# Patient Record
Sex: MALE | Race: WHITE | NOT HISPANIC OR LATINO | Employment: FULL TIME | URBAN - METROPOLITAN AREA
[De-identification: names, ages, dates, MRNs, and addresses within clinical notes are randomized per-mention and may not be internally consistent; named-entity substitution may affect disease eponyms.]

---

## 2023-02-25 LAB
ALANINE AMINOTRANSFERASE (SGPT) (U/L) IN SER/PLAS: 21 U/L (ref 10–52)
ALBUMIN (G/DL) IN SER/PLAS: 4.3 G/DL (ref 3.4–5)
ALKALINE PHOSPHATASE (U/L) IN SER/PLAS: 39 U/L (ref 33–136)
ANION GAP IN SER/PLAS: 11 MMOL/L (ref 10–20)
ASPARTATE AMINOTRANSFERASE (SGOT) (U/L) IN SER/PLAS: 22 U/L (ref 9–39)
BASOPHILS (10*3/UL) IN BLOOD BY AUTOMATED COUNT: 0.06 X10E9/L (ref 0–0.1)
BASOPHILS/100 LEUKOCYTES IN BLOOD BY AUTOMATED COUNT: 0.8 % (ref 0–2)
BILIRUBIN TOTAL (MG/DL) IN SER/PLAS: 0.6 MG/DL (ref 0–1.2)
C REACTIVE PROTEIN (MG/L) IN SER/PLAS: 0.15 MG/DL
CALCIUM (MG/DL) IN SER/PLAS: 9.5 MG/DL (ref 8.6–10.3)
CARBON DIOXIDE, TOTAL (MMOL/L) IN SER/PLAS: 25 MMOL/L (ref 21–32)
CHLORIDE (MMOL/L) IN SER/PLAS: 105 MMOL/L (ref 98–107)
CHOLESTEROL (MG/DL) IN SER/PLAS: 223 MG/DL (ref 0–199)
CHOLESTEROL IN HDL (MG/DL) IN SER/PLAS: 78.5 MG/DL
CHOLESTEROL/HDL RATIO: 2.8
CREATINE KINASE (U/L) IN SER/PLAS: 55 U/L (ref 0–325)
CREATININE (MG/DL) IN SER/PLAS: 0.85 MG/DL (ref 0.5–1.3)
EOSINOPHILS (10*3/UL) IN BLOOD BY AUTOMATED COUNT: 0.46 X10E9/L (ref 0–0.7)
EOSINOPHILS/100 LEUKOCYTES IN BLOOD BY AUTOMATED COUNT: 6.5 % (ref 0–6)
ERYTHROCYTE DISTRIBUTION WIDTH (RATIO) BY AUTOMATED COUNT: 11.8 % (ref 11.5–14.5)
ERYTHROCYTE MEAN CORPUSCULAR HEMOGLOBIN CONCENTRATION (G/DL) BY AUTOMATED: 34.3 G/DL (ref 32–36)
ERYTHROCYTE MEAN CORPUSCULAR VOLUME (FL) BY AUTOMATED COUNT: 91 FL (ref 80–100)
ERYTHROCYTES (10*6/UL) IN BLOOD BY AUTOMATED COUNT: 5.17 X10E12/L (ref 4.5–5.9)
GFR MALE: >90 ML/MIN/1.73M2
GLUCOSE (MG/DL) IN SER/PLAS: 91 MG/DL (ref 74–99)
HEMATOCRIT (%) IN BLOOD BY AUTOMATED COUNT: 47.2 % (ref 41–52)
HEMOGLOBIN (G/DL) IN BLOOD: 16.2 G/DL (ref 13.5–17.5)
IMMATURE GRANULOCYTES/100 LEUKOCYTES IN BLOOD BY AUTOMATED COUNT: 0.3 % (ref 0–0.9)
LDL: 129 MG/DL (ref 0–99)
LEUKOCYTES (10*3/UL) IN BLOOD BY AUTOMATED COUNT: 7.1 X10E9/L (ref 4.4–11.3)
LYMPHOCYTES (10*3/UL) IN BLOOD BY AUTOMATED COUNT: 1.19 X10E9/L (ref 1.2–4.8)
LYMPHOCYTES/100 LEUKOCYTES IN BLOOD BY AUTOMATED COUNT: 16.8 % (ref 13–44)
MONOCYTES (10*3/UL) IN BLOOD BY AUTOMATED COUNT: 0.51 X10E9/L (ref 0.1–1)
MONOCYTES/100 LEUKOCYTES IN BLOOD BY AUTOMATED COUNT: 7.2 % (ref 2–10)
NEUTROPHILS (10*3/UL) IN BLOOD BY AUTOMATED COUNT: 4.85 X10E9/L (ref 1.2–7.7)
NEUTROPHILS/100 LEUKOCYTES IN BLOOD BY AUTOMATED COUNT: 68.4 % (ref 40–80)
PLATELETS (10*3/UL) IN BLOOD AUTOMATED COUNT: 296 X10E9/L (ref 150–450)
POTASSIUM (MMOL/L) IN SER/PLAS: 4 MMOL/L (ref 3.5–5.3)
PROTEIN TOTAL: 7.1 G/DL (ref 6.4–8.2)
SEDIMENTATION RATE, ERYTHROCYTE: 21 MM/H (ref 0–20)
SODIUM (MMOL/L) IN SER/PLAS: 137 MMOL/L (ref 136–145)
TRIGLYCERIDE (MG/DL) IN SER/PLAS: 76 MG/DL (ref 0–149)
UREA NITROGEN (MG/DL) IN SER/PLAS: 18 MG/DL (ref 6–23)
VLDL: 15 MG/DL (ref 0–40)

## 2023-02-28 LAB — ALDOLASE SERUM: 3 U/L (ref 1.2–7.6)

## 2023-03-04 LAB
CITRULLINE ANTIBODY, IGG: 3 UNITS (ref 0–19)
Lab: <3 UNITS (ref 0–19)

## 2023-06-06 LAB
CHOLESTEROL (MG/DL) IN SER/PLAS: 177 MG/DL (ref 0–199)
CHOLESTEROL IN HDL (MG/DL) IN SER/PLAS: 76.9 MG/DL
CHOLESTEROL/HDL RATIO: 2.3
LDL: 86 MG/DL (ref 0–99)
TRIGLYCERIDE (MG/DL) IN SER/PLAS: 69 MG/DL (ref 0–149)
VLDL: 14 MG/DL (ref 0–40)

## 2023-10-18 ENCOUNTER — TELEPHONE (OUTPATIENT)
Dept: PRIMARY CARE | Facility: CLINIC | Age: 63
End: 2023-10-18
Payer: COMMERCIAL

## 2023-10-18 PROBLEM — G47.33 OBSTRUCTIVE SLEEP APNEA: Status: ACTIVE | Noted: 2023-10-18

## 2023-10-18 NOTE — TELEPHONE ENCOUNTER
Pt called and said regarding the sleep apnea study, he is getting a dental guard but his insurance [employer] needs the dx codes and cpt codes, pt insurance also called and said the same thing

## 2023-10-26 ENCOUNTER — TELEMEDICINE (OUTPATIENT)
Dept: PRIMARY CARE | Facility: CLINIC | Age: 63
End: 2023-10-26
Payer: COMMERCIAL

## 2023-10-26 VITALS — BODY MASS INDEX: 26.14 KG/M2 | WEIGHT: 193 LBS | HEIGHT: 72 IN

## 2023-10-26 DIAGNOSIS — Z87.09 HISTORY OF INFLUENZA: ICD-10-CM

## 2023-10-26 DIAGNOSIS — J01.00 ACUTE NON-RECURRENT MAXILLARY SINUSITIS: Primary | ICD-10-CM

## 2023-10-26 DIAGNOSIS — R05.1 ACUTE COUGH: ICD-10-CM

## 2023-10-26 PROCEDURE — 99442 PR PHYS/QHP TELEPHONE EVALUATION 11-20 MIN: CPT | Performed by: INTERNAL MEDICINE

## 2023-10-26 RX ORDER — MULTIVITAMIN
1 TABLET ORAL DAILY
COMMUNITY
Start: 2022-03-07

## 2023-10-26 RX ORDER — ATORVASTATIN CALCIUM 40 MG/1
40 TABLET, FILM COATED ORAL DAILY
COMMUNITY
Start: 2022-03-07

## 2023-10-26 RX ORDER — ASPIRIN 81 MG/1
1 TABLET ORAL DAILY
COMMUNITY
Start: 2022-03-07

## 2023-10-26 RX ORDER — QUINIDINE SULFATE 200 MG
1 TABLET ORAL DAILY
COMMUNITY
Start: 2022-03-07

## 2023-10-26 RX ORDER — AZITHROMYCIN 250 MG/1
250 TABLET, FILM COATED ORAL DAILY
Qty: 6 TABLET | Refills: 0 | Status: SHIPPED | OUTPATIENT
Start: 2023-10-26 | End: 2023-10-31

## 2023-10-26 RX ORDER — VITAMIN E 268 MG
1 CAPSULE ORAL DAILY
COMMUNITY
End: 2024-06-06 | Stop reason: WASHOUT

## 2023-10-26 ASSESSMENT — ENCOUNTER SYMPTOMS
CHILLS: 0
WHEEZING: 1
HEARTBURN: 0
LOSS OF SENSATION IN FEET: 0
FEVER: 0
SWEATS: 0
COUGH: 1
SHORTNESS OF BREATH: 0
SORE THROAT: 1
OCCASIONAL FEELINGS OF UNSTEADINESS: 0
HEADACHES: 0
MYALGIAS: 1
DEPRESSION: 0
HEMOPTYSIS: 0
RHINORRHEA: 1
WEIGHT LOSS: 0

## 2023-10-26 ASSESSMENT — PATIENT HEALTH QUESTIONNAIRE - PHQ9
2. FEELING DOWN, DEPRESSED OR HOPELESS: NOT AT ALL
1. LITTLE INTEREST OR PLEASURE IN DOING THINGS: NOT AT ALL
SUM OF ALL RESPONSES TO PHQ9 QUESTIONS 1 & 2: 0

## 2023-10-26 ASSESSMENT — LIFESTYLE VARIABLES
HOW OFTEN DO YOU HAVE A DRINK CONTAINING ALCOHOL: NEVER
HOW OFTEN DO YOU HAVE SIX OR MORE DRINKS ON ONE OCCASION: NEVER
AUDIT-C TOTAL SCORE: 0
SKIP TO QUESTIONS 9-10: 1
HOW MANY STANDARD DRINKS CONTAINING ALCOHOL DO YOU HAVE ON A TYPICAL DAY: PATIENT DOES NOT DRINK

## 2023-10-26 NOTE — PROGRESS NOTES
Patient has telephone appointment For chief complaint of facial pressure, nasal drainage and productive cough for more than a week.  His symptoms suspected 10 days ago.  1 week ago on Thursday he went to the urgent care at [facility] he was tested for COVID-19 infection and influenza.  Results of the COVID-19 test was negative however he probably tested positive for influenza B.  He was offered a specific treatment however he decided to take symptomatic medication because symptoms were not severe.  But for the last 4 to 5 days symptoms get worse he has cough with yellowish sputum nasal drainage and facial pressure it is clinically suspected he might have developed a secondary bacterial sinus infection.  He has no fever or chill.  Denies any shortness of breath or wheezing.  Review of other systems are negative.    On examination: His temperature is 98.3, weight is 193 pounds.  He could not provide any other vital signs  HEENT: He has facial pressure and nasal drainage  Respiratory system has productive cough but has no shortness of breath or wheezing.    No other clinical information is available.    Assessment and plan:  1.  Recent influenza B; patient is outside the window to get any specific treatment for influenza.  2.  Acute sinusitis: Possibly secondary bacterial infection: A course of azithromycin as prescribed  3.  Cough: Patient will take cough medications over-the-counter as needed  Patient is advised to call our office if there is no improvement in symptoms in next few days.

## 2023-11-20 ENCOUNTER — OFFICE VISIT (OUTPATIENT)
Dept: ORTHOPEDIC SURGERY | Facility: CLINIC | Age: 63
End: 2023-11-20
Payer: COMMERCIAL

## 2023-11-20 ENCOUNTER — ANCILLARY PROCEDURE (OUTPATIENT)
Dept: RADIOLOGY | Facility: CLINIC | Age: 63
End: 2023-11-20
Payer: COMMERCIAL

## 2023-11-20 VITALS — HEIGHT: 72 IN | BODY MASS INDEX: 26.01 KG/M2 | WEIGHT: 192 LBS

## 2023-11-20 DIAGNOSIS — M17.11 PRIMARY OSTEOARTHRITIS OF RIGHT KNEE: ICD-10-CM

## 2023-11-20 DIAGNOSIS — M23.41 LOOSE BODY IN KNEE, RIGHT KNEE: ICD-10-CM

## 2023-11-20 DIAGNOSIS — S83.231A COMPLEX TEAR OF MEDIAL MENISCUS OF RIGHT KNEE AS CURRENT INJURY, INITIAL ENCOUNTER: Primary | ICD-10-CM

## 2023-11-20 PROCEDURE — 99213 OFFICE O/P EST LOW 20 MIN: CPT | Performed by: ORTHOPAEDIC SURGERY

## 2023-11-20 PROCEDURE — 2500000005 HC RX 250 GENERAL PHARMACY W/O HCPCS: Performed by: ORTHOPAEDIC SURGERY

## 2023-11-20 PROCEDURE — 2500000004 HC RX 250 GENERAL PHARMACY W/ HCPCS (ALT 636 FOR OP/ED): Performed by: ORTHOPAEDIC SURGERY

## 2023-11-20 PROCEDURE — 73560 X-RAY EXAM OF KNEE 1 OR 2: CPT | Mod: RIGHT SIDE | Performed by: ORTHOPAEDIC SURGERY

## 2023-11-20 PROCEDURE — 20610 DRAIN/INJ JOINT/BURSA W/O US: CPT | Performed by: ORTHOPAEDIC SURGERY

## 2023-11-20 PROCEDURE — 99203 OFFICE O/P NEW LOW 30 MIN: CPT | Performed by: ORTHOPAEDIC SURGERY

## 2023-11-20 PROCEDURE — 73560 X-RAY EXAM OF KNEE 1 OR 2: CPT | Mod: RT,FY

## 2023-11-20 PROCEDURE — 1036F TOBACCO NON-USER: CPT | Performed by: ORTHOPAEDIC SURGERY

## 2023-11-20 RX ORDER — BETAMETHASONE SODIUM PHOSPHATE AND BETAMETHASONE ACETATE 3; 3 MG/ML; MG/ML
12 INJECTION, SUSPENSION INTRA-ARTICULAR; INTRALESIONAL; INTRAMUSCULAR; SOFT TISSUE ONCE
Status: COMPLETED | OUTPATIENT
Start: 2023-11-20 | End: 2023-11-20

## 2023-11-20 RX ORDER — LIDOCAINE HYDROCHLORIDE 10 MG/ML
5 INJECTION INFILTRATION; PERINEURAL ONCE
Status: COMPLETED | OUTPATIENT
Start: 2023-11-20 | End: 2023-11-20

## 2023-11-20 RX ADMIN — BETAMETHASONE SODIUM PHOSPHATE AND BETAMETHASONE ACETATE 12 MG: 3; 3 INJECTION, SUSPENSION INTRA-ARTICULAR; INTRALESIONAL; INTRAMUSCULAR at 16:06

## 2023-11-20 RX ADMIN — LIDOCAINE HYDROCHLORIDE 5 ML: 10 INJECTION, SOLUTION INFILTRATION; PERINEURAL at 16:06

## 2023-11-20 ASSESSMENT — PATIENT HEALTH QUESTIONNAIRE - PHQ9
SUM OF ALL RESPONSES TO PHQ9 QUESTIONS 1 AND 2: 0
1. LITTLE INTEREST OR PLEASURE IN DOING THINGS: NOT AT ALL
2. FEELING DOWN, DEPRESSED OR HOPELESS: NOT AT ALL

## 2023-11-20 ASSESSMENT — PAIN - FUNCTIONAL ASSESSMENT: PAIN_FUNCTIONAL_ASSESSMENT: 0-10

## 2023-11-20 ASSESSMENT — PAIN SCALES - GENERAL: PAINLEVEL_OUTOF10: 7

## 2023-11-20 NOTE — PROGRESS NOTES
History of present: Patient seen years and years ago for calf injury shoulder injury now with mechanical popping catching in his right knee    He has a history when he was in high school having an open MCL shift to his knee he was in a long-leg cast for many months he has a staple in his medial femoral condyle he is got some calcified loose pieces medially now and now he notices the knee giving out locking up and then catching        Past medical history:    The patient's past medical history, family history, social history and review of systems were documented on the patient's medical intake form.  The medical intake form was reviewed and scanned into the electronic medical record for future use.  History is otherwise negative except as stated in the history of present illness.    Physical exam:    General: Alert and oriented to person place and time.  No acute distress and breathing comfortably, pleasant and cooperative with examination.    Head and neck exam: Head is normocephalic atraumatic.    Neck: Supple no visible swelling or deformity.    Cardiovascular: Good perfusion to affected extremities without signs or symptoms of chest pain.    Lungs no audible wheezing or labored breathing on examination.    Abdominal exam: Nondistended nontender    Extremities: The affected right knee was examined and inspected and was tender to touch along the medial and lateral aspect with catching, locking or mechanical symptoms.    The skin was intact without breakdown or open wound.  Old incisions of present were healed.    There was a mild Venkatesh exam seen with some evidence of instability and weakness in the collateral ligaments with varus valgus stressing and laxity in the anterior or posterior planes.    There was a negative Lachman's test pivot shift test and posterior drawer sign with no foot drop, numbness or tingling.    Sensation, reflexes and pulses in the foot and ankle are preserved.  There was an effusion.   Range of motion showed good straight leg raise with flexion to 150 degrees  and extension to 0 degrees degrees.  The patient had the ability to bear weight but with discomfort.  The patient's gait was antalgic secondary to discomfort      Before aspiration injection the benefits of a cortisone injection including infection, local skin irritation, skin atrophy, calcification, continued pain and discomfort, elevated blood sugar, burning, failure to relieve pain, possible late infection were discussed with the patient.    Postprocedure discomfort can be alleviated with additional medications, ice, elevation, rest over the first 24 hours as recommended.    Patient verbalized understanding and wanted to proceed with the planned procedure.    After informed consent was provided and allergies verified, the patient was positioned appropriately on the bed.  The right knee to be aspirated and injected was prepped and draped in a sterile fashion.  The skin was anesthetized with ethyl chloride spray.  A joint aspiration was to be performed an 18-gauge needle was used otherwise a 22-gauge needle was used to inject the appropriate joint.    Joint injection was performed with a mixture of 5 cc 1% lidocaine plain and 2 cc Celestone Soluspan 6 mg per mL.  The needle was removed and the puncture site closed and sealed with a Band-Aid.  The patient tolerated the procedure well.            Diagnostic studies: X-rays show joint space narrowing near bone-on-bone medially stable in the medial femoral condyle with loose bodies particularly in the medial gutter      Impression: Questionable loose body, early arthritis old history of MCL open reconstruction possible degenerative meniscus tear      Plan: Right knee MRI    Injection ice elevate    Low impact activities anti-inflammatories if safe and tolerable    We will see him back after MRI imaging study

## 2023-12-07 ENCOUNTER — ANCILLARY PROCEDURE (OUTPATIENT)
Dept: RADIOLOGY | Facility: CLINIC | Age: 63
End: 2023-12-07
Payer: COMMERCIAL

## 2023-12-07 DIAGNOSIS — M17.11 PRIMARY OSTEOARTHRITIS OF RIGHT KNEE: ICD-10-CM

## 2023-12-07 DIAGNOSIS — S83.231A COMPLEX TEAR OF MEDIAL MENISCUS OF RIGHT KNEE AS CURRENT INJURY, INITIAL ENCOUNTER: ICD-10-CM

## 2023-12-07 DIAGNOSIS — M23.41 LOOSE BODY IN KNEE, RIGHT KNEE: ICD-10-CM

## 2023-12-07 PROCEDURE — 73721 MRI JNT OF LWR EXTRE W/O DYE: CPT | Mod: RIGHT SIDE | Performed by: RADIOLOGY

## 2023-12-07 PROCEDURE — 73721 MRI JNT OF LWR EXTRE W/O DYE: CPT | Mod: RT

## 2023-12-14 ENCOUNTER — APPOINTMENT (OUTPATIENT)
Dept: ORTHOPEDIC SURGERY | Facility: CLINIC | Age: 63
End: 2023-12-14
Payer: COMMERCIAL

## 2023-12-14 ENCOUNTER — OFFICE VISIT (OUTPATIENT)
Dept: ORTHOPEDIC SURGERY | Facility: CLINIC | Age: 63
End: 2023-12-14
Payer: COMMERCIAL

## 2023-12-14 DIAGNOSIS — M17.11 PRIMARY OSTEOARTHRITIS OF RIGHT KNEE: Primary | ICD-10-CM

## 2023-12-14 PROCEDURE — 99213 OFFICE O/P EST LOW 20 MIN: CPT | Performed by: ORTHOPAEDIC SURGERY

## 2023-12-14 PROCEDURE — L1812 KO ELASTIC W/JOINTS PRE OTS: HCPCS | Performed by: ORTHOPAEDIC SURGERY

## 2023-12-14 PROCEDURE — 1036F TOBACCO NON-USER: CPT | Performed by: ORTHOPAEDIC SURGERY

## 2023-12-14 NOTE — PROGRESS NOTES
History of present illness: Patient here follow-up after cortisone injection in the affected right knee MRI showed combination of thickening tricompartmental arthritis cystic change laterally degenerative meniscus tearing thickened MCL stable fixation remained stable    Physical exam:    General: No acute distress or breathing difficulty or discomfort, pleasant and cooperative with the examination.    Extremities: Right knee examination today completed again patient says in his own words he is 80% improved    Straight leg raise is good he can walk he can bend he has more pain when he twists and pivots negative Lachman's collateral.  He is intact but irritable laterally    Mild Venkatesh with deep flexion 0-100 70s range of motion mild effusion injection sites clean and dry good foot and ankle motion good straight leg raise intact extensor mechanism    Diagnostic studies: Reviewed MRI today again with a degenerative meniscal finding degenerative osteoarthritis thickening of the MCL and cystic change laterally    Impression: Gel shot for comfort for arthritic changes recommended and ordered bracing for stability with right knee mild discomfort combination of arthritis and meniscal tearing and thickening of the collateral ligaments all right knee    Plan: Will order the brace    Will order the gels    Ice elevate low impact activities we spent time talking about exercise and range of motion programs low impact activity hopefully we can avoid arthroplasty down the road a scope may be in order hopefully can avoid arthroplasty for years to come

## 2023-12-28 ENCOUNTER — OFFICE VISIT (OUTPATIENT)
Dept: ORTHOPEDIC SURGERY | Facility: CLINIC | Age: 63
End: 2023-12-28
Payer: COMMERCIAL

## 2023-12-28 DIAGNOSIS — M17.11 PRIMARY OSTEOARTHRITIS OF RIGHT KNEE: ICD-10-CM

## 2023-12-28 PROCEDURE — 2500000004 HC RX 250 GENERAL PHARMACY W/ HCPCS (ALT 636 FOR OP/ED): Mod: JZ | Performed by: ORTHOPAEDIC SURGERY

## 2023-12-28 PROCEDURE — 1036F TOBACCO NON-USER: CPT | Performed by: ORTHOPAEDIC SURGERY

## 2023-12-28 PROCEDURE — 20610 DRAIN/INJ JOINT/BURSA W/O US: CPT | Performed by: ORTHOPAEDIC SURGERY

## 2023-12-28 RX ADMIN — Medication 48 MG: at 11:32

## 2023-12-28 NOTE — PROGRESS NOTES
Before the right injection the benefits of a hyaluronic acid  injection including infection, local skin irritation, skin atrophy, calcification, continued pain and discomfort, elevated blood sugar, burning, failure to relieve pain, possible late infection were discussed with the patient.    Postprocedure discomfort can be alleviated with additional medications, ice, elevation, rest over the first 24 hours as recommended.    Patient verbalized understanding and wanted to proceed with the planned procedure.    After informed consent was provided and allergies verified, the patient was positioned appropriately on the bed.  The RIGHT KNEE to be injected was prepped and draped in a sterile fashion.  The skin was anesthetized with ethyl chloride spray.   A  22-gauge needle was used to inject the appropriate joint.    Joint injection was performed with Synvisc 1 injection contains 48 mg of hyaluronic acid per 6 mL was performed.  The needle was removed and the puncture site closed and sealed with a Band-Aid.  The patient tolerated the procedure well.

## 2024-05-31 ENCOUNTER — LAB (OUTPATIENT)
Dept: LAB | Facility: LAB | Age: 64
End: 2024-05-31
Payer: COMMERCIAL

## 2024-05-31 DIAGNOSIS — E78.2 MIXED HYPERLIPIDEMIA: Primary | ICD-10-CM

## 2024-05-31 LAB
CHOLEST SERPL-MCNC: 163 MG/DL (ref 0–199)
CHOLESTEROL/HDL RATIO: 2
HDLC SERPL-MCNC: 80.2 MG/DL
LDLC SERPL CALC-MCNC: 70 MG/DL
NON HDL CHOLESTEROL: 83 MG/DL (ref 0–149)
TRIGL SERPL-MCNC: 62 MG/DL (ref 0–149)
VLDL: 12 MG/DL (ref 0–40)

## 2024-05-31 PROCEDURE — 80061 LIPID PANEL: CPT

## 2024-05-31 PROCEDURE — 36415 COLL VENOUS BLD VENIPUNCTURE: CPT

## 2024-06-06 ENCOUNTER — OFFICE VISIT (OUTPATIENT)
Dept: CARDIOLOGY | Facility: CLINIC | Age: 64
End: 2024-06-06
Payer: COMMERCIAL

## 2024-06-06 VITALS
DIASTOLIC BLOOD PRESSURE: 78 MMHG | HEART RATE: 66 BPM | WEIGHT: 198.4 LBS | SYSTOLIC BLOOD PRESSURE: 142 MMHG | BODY MASS INDEX: 26.91 KG/M2

## 2024-06-06 DIAGNOSIS — E78.2 MIXED HYPERLIPIDEMIA: Primary | ICD-10-CM

## 2024-06-06 DIAGNOSIS — I25.10 CORONARY ARTERY DISEASE INVOLVING NATIVE HEART, UNSPECIFIED VESSEL OR LESION TYPE, UNSPECIFIED WHETHER ANGINA PRESENT: ICD-10-CM

## 2024-06-06 DIAGNOSIS — Z78.9 NEVER SMOKED CIGARETTES: ICD-10-CM

## 2024-06-06 DIAGNOSIS — G47.33 OBSTRUCTIVE SLEEP APNEA: ICD-10-CM

## 2024-06-06 PROCEDURE — 3008F BODY MASS INDEX DOCD: CPT | Performed by: INTERNAL MEDICINE

## 2024-06-06 PROCEDURE — 1036F TOBACCO NON-USER: CPT | Performed by: INTERNAL MEDICINE

## 2024-06-06 PROCEDURE — 99214 OFFICE O/P EST MOD 30 MIN: CPT | Performed by: INTERNAL MEDICINE

## 2024-06-06 RX ORDER — DICLOFENAC SODIUM 10 MG/G
4 GEL TOPICAL 4 TIMES DAILY
COMMUNITY

## 2024-06-06 RX ORDER — GLUCOSAM/CHONDRO/HERB 149/HYAL 750-100 MG
TABLET ORAL
COMMUNITY

## 2024-06-06 NOTE — PROGRESS NOTES
CARDIOLOGY OFFICE VISIT      CHIEF COMPLAINT      HISTORY OF PRESENT ILLNESS  The patient states that he has been feeling well.  He denies chest discomfort or symptoms of myocardial ischemia.  He denies dyspnea exertion.  He denies palpitations and syncope.  He states he is having more problems with his right knee and has not been exercising like he normally does.  He states he was told by orthopedics that probably within 5 years he will need to have knee surgery.  He is not having any problem with his medication.  His blood pressure is borderline elevated.  I told him he needs to start checking it regularly and making a chart and call me with results in about a month.  I did go over his recent lipid profile with him.  This is much improved.  Cholesterol 163, HDL 80, LDL 70, triglycerides 62.      IMPRESSION:   1. Mixed hyperlipidemia.  2. 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  3. Strong family history of coronary artery disease, father  at age 52 years from a myocardial infarction.  4. Overweight.  5. Coronary artery disease, no angina, CT coronary artery calcium score 57 May 2019  6. Obstructive sleep apnea  7. History of transient global amnesia     Please excuse any errors in grammar or translation related to this dictation. Voice recognition software was utilized to prepare this document.     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     Allergies:  Allergies   Allergen Reactions    Pollen Extracts Wheezing and Runny nose        Outpatient Medications:  Current Outpatient Medications   Medication Instructions    alpha tocopherol (Vitamin E) 268 mg (400 unit) capsule 1 capsule, oral, Daily    aspirin 81 mg EC tablet 1 tablet, oral, Daily    atorvastatin (LIPITOR) 40 mg, oral, Daily    coenzyme Q-10 300 mg capsule capsule 1 capsule, oral, Daily    multivitamin tablet 1 tablet, oral, Daily    mv-mn-iron-FA-herbal cmplx#190 (Vitamin D3 Complete) 18 mg iron-800 mcg-150 mg tablet 1 tablet, oral, Daily    vit B complx/folic acid/lysine (B COMPLEX VITAMINS PO) oral          REVIEW OF SYSTEMS  Review of Systems   All other systems reviewed and are negative.        VITALS  There were no vitals filed for this visit.    PHYSICAL EXAM  Constitutional:       Appearance: Healthy appearance. Not in distress.   Neck:      Vascular: No JVR. JVD normal.   Pulmonary:      Effort: Pulmonary effort is normal.      Breath sounds: Normal breath sounds. No wheezing. No rhonchi. No rales.   Chest:      Chest wall: Not tender to palpatation.   Cardiovascular:      PMI at left midclavicular line. Normal rate. Regular rhythm. Normal S1. Normal S2.       Murmurs: There is no murmur.      No gallop.  No click. No rub.   Pulses:     Intact distal pulses.   Edema:     Peripheral edema absent.   Abdominal:      General: Bowel sounds are normal.      Palpations: Abdomen is soft.      Tenderness: There is no abdominal tenderness.   Musculoskeletal: Normal range of motion.         General: No tenderness. Skin:     General: Skin is warm and dry.   Neurological:      General: No focal deficit present.      Mental Status: Alert and oriented to person, place and time.           ASSESSMENT AND PLAN  Diagnoses and all orders for this visit:  Coronary artery disease involving native heart, unspecified vessel or lesion type, unspecified whether angina present  Mixed  hyperlipidemia  Obstructive sleep apnea  BMI 26.0-26.9,adult  Never smoked cigarettes      [unfilled]

## 2024-06-06 NOTE — PATIENT INSTRUCTIONS
Continue same medications and treatments.   Patient educated on proper medication use.   Patient educated on risk factor modification.   Please bring any lab results from other providers / physicians to your next appointment.     Please bring all medicines, vitamins, and herbal supplements with you when you come to the office.     Prescriptions will not be filled unless you are compliant with your follow up appointments or have a follow up appointment scheduled as per instruction of your physician. Refills should be requested at the time of your visit.    FOLLOW UP IN 1 YEAR  OBTAIN LAB WORK PRIOR TO THIS VISIT, THIS WILL BE FASTING    I, Ileana Salmeron LPN, am scribing for and in the presence of Dr. Jeovany Doty MD, FACC

## 2024-07-04 DIAGNOSIS — E78.2 MIXED HYPERLIPIDEMIA: ICD-10-CM

## 2024-07-05 RX ORDER — ATORVASTATIN CALCIUM 40 MG/1
40 TABLET, FILM COATED ORAL DAILY
Qty: 90 TABLET | Refills: 3 | Status: SHIPPED | OUTPATIENT
Start: 2024-07-05

## 2024-07-05 NOTE — TELEPHONE ENCOUNTER
Received request for prescription refills for patient.   Patient follows with Dr. Doty    Request is for Lipitor  Is patient currently on medication yes    Last OV 6/6/2024  Next OV 6/4/2025    Pended for signing and sent to provider

## 2024-10-02 ENCOUNTER — OFFICE VISIT (OUTPATIENT)
Dept: ORTHOPEDIC SURGERY | Facility: CLINIC | Age: 64
End: 2024-10-02
Payer: COMMERCIAL

## 2024-10-02 DIAGNOSIS — M17.11 PRIMARY OSTEOARTHRITIS OF RIGHT KNEE: Primary | ICD-10-CM

## 2024-10-02 PROCEDURE — 2500000004 HC RX 250 GENERAL PHARMACY W/ HCPCS (ALT 636 FOR OP/ED): Performed by: ORTHOPAEDIC SURGERY

## 2024-10-02 PROCEDURE — 20610 DRAIN/INJ JOINT/BURSA W/O US: CPT | Mod: RT | Performed by: ORTHOPAEDIC SURGERY

## 2024-10-02 PROCEDURE — 99213 OFFICE O/P EST LOW 20 MIN: CPT | Performed by: ORTHOPAEDIC SURGERY

## 2024-10-02 RX ORDER — BETAMETHASONE SODIUM PHOSPHATE AND BETAMETHASONE ACETATE 3; 3 MG/ML; MG/ML
2 INJECTION, SUSPENSION INTRA-ARTICULAR; INTRALESIONAL; INTRAMUSCULAR; SOFT TISSUE
Status: COMPLETED | OUTPATIENT
Start: 2024-10-02 | End: 2024-10-02

## 2024-10-02 RX ORDER — LIDOCAINE HYDROCHLORIDE 10 MG/ML
5 INJECTION, SOLUTION INFILTRATION; PERINEURAL
Status: COMPLETED | OUTPATIENT
Start: 2024-10-02 | End: 2024-10-02

## 2024-10-02 NOTE — PROGRESS NOTES
History of present illness: History knee arthritis previous open MCL reconstruction    In the past he had a mixed result with cortisone and gel shots    He is trying to get through to the spring 2025 before arthroplasty    Physical exam:    General: No acute distress or breathing difficulty or discomfort, pleasant and cooperative with the examination.    Extremities: The affected right knee was examined and inspected and was tender to touch along the medial and lateral aspect with catching, locking or mechanical symptoms.    The skin was intact without breakdown or open wound.  Old incisions of present were healed.    There was a mild Venkatesh exam seen with some evidence of instability and weakness in the collateral ligaments with varus valgus stressing and laxity in the anterior or posterior planes.    There was a negative Lachman's test pivot shift test and posterior drawer sign with no foot drop, numbness or tingling.    Sensation, reflexes and pulses in the foot and ankle are preserved.  There was an effusion.  Range of motion showed good straight leg raise with flexion to 150 degrees  and extension to 0 degrees degrees.  The patient had the ability to bear weight but with discomfort.  The patient's gait was antalgic secondary to discomfort      Before aspiration injection the benefits of a cortisone injection including infection, local skin irritation, skin atrophy, calcification, continued pain and discomfort, elevated blood sugar, burning, failure to relieve pain, possible late infection were discussed with the patient.    Postprocedure discomfort can be alleviated with additional medications, ice, elevation, rest over the first 24 hours as recommended.    Patient verbalized understanding and wanted to proceed with the planned procedure.    After informed consent was provided and allergies verified, the patient was positioned appropriately on the bed.  The right knee to be aspirated and injected was prepped  and draped in a sterile fashion.  The skin was anesthetized with ethyl chloride spray.  A joint aspiration was to be performed an 18-gauge needle was used otherwise a 22-gauge needle was used to inject the appropriate joint.    Joint injection was performed with a mixture of 5 cc 1% lidocaine plain and 2 cc Celestone Soluspan 6 mg per mL.  The needle was removed and the puncture site closed and sealed with a Band-Aid.  The patient tolerated the procedure well.        Diagnostic studies: No new x-ray    We did review the x-rays from last November and the MRI from December 2023 showing a combination of moderate to severe arthrosis with underlying meniscus tear    Impression: Right knee advancing arthritis    Plan: Injection cortisone ice elevate he already has a brace low impact range of motion program he is going to use Voltaren gels certain anti-inflammatories if okay from his medical doctors also lidocaine patches may be beneficial he is already tried bracing    I will see him back in 3 months with AP lateral standing x-ray right knee    If his cortisone shot gives him brief relief he will call [facility] to order gel shots, we also talked about the remote chance of a knee arthroscopy giving him some temporary relief as well due to the known meniscal tearing    Again if his cortisone shot fails to give him substantial relief his options at this point would be to consider a knee arthroscopy and/or reapply for gel injections    L Inj/Asp: R knee on 10/2/2024 9:03 AM  Indications: pain and diagnostic evaluation  Details: 22 G needle, medial approach  Medications: 5 mL lidocaine 10 mg/mL (1 %); 2 mL betamethasone acet,sod phos 6 mg/mL  Outcome: tolerated well, no immediate complications  Procedure, treatment alternatives, risks and benefits explained, specific risks discussed. Consent was given by the patient. Immediately prior to procedure a time out was called to verify the correct patient, procedure, equipment, support  staff and site/side marked as required. Patient was prepped and draped in the usual sterile fashion.

## 2025-01-09 ENCOUNTER — OFFICE VISIT (OUTPATIENT)
Dept: ORTHOPEDIC SURGERY | Facility: CLINIC | Age: 65
End: 2025-01-09
Payer: COMMERCIAL

## 2025-01-09 ENCOUNTER — HOSPITAL ENCOUNTER (OUTPATIENT)
Dept: RADIOLOGY | Facility: CLINIC | Age: 65
Discharge: HOME | End: 2025-01-09
Payer: COMMERCIAL

## 2025-01-09 DIAGNOSIS — M23.41 LOOSE BODY IN KNEE, RIGHT KNEE: ICD-10-CM

## 2025-01-09 DIAGNOSIS — M17.11 PRIMARY OSTEOARTHRITIS OF RIGHT KNEE: Primary | ICD-10-CM

## 2025-01-09 PROCEDURE — 99213 OFFICE O/P EST LOW 20 MIN: CPT | Performed by: ORTHOPAEDIC SURGERY

## 2025-01-09 PROCEDURE — 73560 X-RAY EXAM OF KNEE 1 OR 2: CPT | Mod: RIGHT SIDE | Performed by: ORTHOPAEDIC SURGERY

## 2025-01-09 PROCEDURE — 73560 X-RAY EXAM OF KNEE 1 OR 2: CPT | Mod: RT

## 2025-01-09 NOTE — PROGRESS NOTES
History of present illness: History of advanced right knee arthritis    He had an old MCL reconstruction with stable his knee is very stable but painful despite rest activity modification cortisone shot gel injections therapy water program elliptical both steroid and gel shots he is obviously having pain with ADLs would like to proceed with knee    Physical exam:    General: No acute distress or breathing difficulty or discomfort, pleasant and cooperative with the examination.    Extremities: The affected right knee was examined and inspected and was tender to touch along the medial and lateral aspect with catching, locking or mechanical symptoms.    The skin was intact without breakdown or open wound.  Old incisions of present were healed.    There was a mild Venkatesh exam seen with some evidence of instability and weakness in the collateral ligaments with varus valgus stressing and laxity in the anterior or posterior planes.    There was a negative Lachman's test pivot shift test and posterior drawer sign with no foot drop, numbness or tingling.    Sensation, reflexes and pulses in the foot and ankle are preserved.  There was an effusion.  Range of motion showed good straight leg raise with flexion to 150 degrees and extension to 0 degrees.  The patient had the ability to bear weight but with discomfort.  The patient's gait was antalgic secondary to discomfort    Diagnostic studies: X-rays show advancing now osteoarthritic change near bone-on-bone with severe patellofemoral arthrosis old staple and a loose ossicle medial    Impression: Right knee osteoarthritis advancing in nature with severe arthrosis severe patellofemoral arthritis   stable old MCL repair with staple and a loose ossicle in the MCL right knee    Plan: Right knee replacement staple removal    Risk and benefits of surgery discussed extensively with the patient.    Surgical risk included but were not limited to infection, wear, loosening, need for  further surgery blood clot, failure to heal, failure of the surgery, stiffness, loss of limb life, extremity function change in length change, and associated risks of  any surgery.    Patient anticipates going home the next morning using a large doing her stay suites at the  ambulatory surgery center and then going direct outpatient and rehab program at [facility]    Staple movable total knee replacement and will look for to see him in the operative schedule

## 2025-03-10 ENCOUNTER — OFFICE VISIT (OUTPATIENT)
Dept: PRIMARY CARE | Facility: CLINIC | Age: 65
End: 2025-03-10
Payer: COMMERCIAL

## 2025-03-10 VITALS
HEIGHT: 72 IN | HEART RATE: 67 BPM | BODY MASS INDEX: 27.63 KG/M2 | SYSTOLIC BLOOD PRESSURE: 157 MMHG | OXYGEN SATURATION: 93 % | WEIGHT: 204 LBS | DIASTOLIC BLOOD PRESSURE: 84 MMHG

## 2025-03-10 DIAGNOSIS — M19.90 ARTHRITIS: Primary | ICD-10-CM

## 2025-03-10 PROCEDURE — 99213 OFFICE O/P EST LOW 20 MIN: CPT | Performed by: FAMILY MEDICINE

## 2025-03-10 PROCEDURE — 1036F TOBACCO NON-USER: CPT | Performed by: FAMILY MEDICINE

## 2025-03-10 PROCEDURE — 3008F BODY MASS INDEX DOCD: CPT | Performed by: FAMILY MEDICINE

## 2025-03-10 RX ORDER — MELOXICAM 15 MG/1
15 TABLET ORAL DAILY
Qty: 90 TABLET | Refills: 0 | Status: SHIPPED | OUTPATIENT
Start: 2025-03-10 | End: 2025-06-08

## 2025-03-10 ASSESSMENT — ENCOUNTER SYMPTOMS
CHILLS: 0
HEADACHES: 0
LOSS OF SENSATION IN FEET: 0
DIZZINESS: 0
FATIGUE: 0
OCCASIONAL FEELINGS OF UNSTEADINESS: 0
CHEST TIGHTNESS: 0
SHORTNESS OF BREATH: 0
DEPRESSION: 0

## 2025-03-10 ASSESSMENT — PATIENT HEALTH QUESTIONNAIRE - PHQ9
SUM OF ALL RESPONSES TO PHQ9 QUESTIONS 1 & 2: 0
2. FEELING DOWN, DEPRESSED OR HOPELESS: NOT AT ALL
1. LITTLE INTEREST OR PLEASURE IN DOING THINGS: NOT AT ALL

## 2025-03-10 NOTE — PROGRESS NOTES
Subjective   Patient ID: Mark Mendez is a 64 y.o. male who presents for Arthritis (Arthritis pain all over, x 1 month ).    Arthritis   - reports for the last month he has been having significantly worse arthritis pain   - has been having issues when he sits still for long periods of time will have persistent pain afterwards   - pain is worse when he is more active   - has pain all over his body, both in the muscles and the joints   - pain in his shoulders, hands, thighs, calves   - does stay active and works out with an elliptical which does help his pain   - has plans for a knee replacement in 1 month   - denies any warmth, redness, swelling, fevers or chills   - father did have rheumatoid arthritis          Review of Systems   Constitutional:  Negative for chills and fatigue.   Respiratory:  Negative for chest tightness and shortness of breath.    Neurological:  Negative for dizziness and headaches.       Objective   /84 (BP Location: Right arm)   Pulse 67   Ht 1.829 m (6')   Wt 92.5 kg (204 lb)   SpO2 93%   BMI 27.67 kg/m²     Physical Exam  Constitutional:       Appearance: Normal appearance.   Cardiovascular:      Rate and Rhythm: Normal rate and regular rhythm.   Pulmonary:      Effort: Pulmonary effort is normal.      Breath sounds: Normal breath sounds.   Neurological:      Mental Status: He is alert.   Psychiatric:         Mood and Affect: Mood normal.         Behavior: Behavior normal.         Assessment/Plan   Problem List Items Addressed This Visit    None  Visit Diagnoses         Codes    Arthritis    -  Primary M19.90    stable   - NSAID PRN   - has knee replacement scheduled in 2 weeks   - recommend PT after testing     Relevant Medications    meloxicam (Mobic) 15 mg tablet

## 2025-03-12 ENCOUNTER — LAB (OUTPATIENT)
Dept: LAB | Facility: HOSPITAL | Age: 65
End: 2025-03-12
Payer: COMMERCIAL

## 2025-03-12 ENCOUNTER — APPOINTMENT (OUTPATIENT)
Dept: CARDIOLOGY | Facility: CLINIC | Age: 65
End: 2025-03-12
Payer: COMMERCIAL

## 2025-03-12 VITALS
DIASTOLIC BLOOD PRESSURE: 78 MMHG | WEIGHT: 200.5 LBS | HEART RATE: 70 BPM | BODY MASS INDEX: 27.16 KG/M2 | SYSTOLIC BLOOD PRESSURE: 140 MMHG | HEIGHT: 72 IN

## 2025-03-12 DIAGNOSIS — I25.10 CORONARY ARTERY DISEASE INVOLVING NATIVE HEART, UNSPECIFIED VESSEL OR LESION TYPE, UNSPECIFIED WHETHER ANGINA PRESENT: ICD-10-CM

## 2025-03-12 DIAGNOSIS — Z01.818 PRE-OP TESTING: ICD-10-CM

## 2025-03-12 DIAGNOSIS — E78.2 MIXED HYPERLIPIDEMIA: ICD-10-CM

## 2025-03-12 DIAGNOSIS — Z82.49 FAMILY HISTORY OF CORONARY ARTERY DISEASE: ICD-10-CM

## 2025-03-12 DIAGNOSIS — Z78.9 NEVER SMOKED CIGARETTES: ICD-10-CM

## 2025-03-12 DIAGNOSIS — G47.33 OBSTRUCTIVE SLEEP APNEA: ICD-10-CM

## 2025-03-12 DIAGNOSIS — Z01.818 PREOPERATIVE CLEARANCE: ICD-10-CM

## 2025-03-12 LAB
ALBUMIN SERPL BCP-MCNC: 4.4 G/DL (ref 3.4–5)
ALP SERPL-CCNC: 59 U/L (ref 33–136)
ALT SERPL W P-5'-P-CCNC: 24 U/L (ref 10–52)
ANION GAP SERPL CALC-SCNC: 10 MMOL/L (ref 10–20)
AST SERPL W P-5'-P-CCNC: 23 U/L (ref 9–39)
BASOPHILS # BLD AUTO: 0.05 X10*3/UL (ref 0–0.1)
BASOPHILS NFR BLD AUTO: 0.6 %
BILIRUB SERPL-MCNC: 0.7 MG/DL (ref 0–1.2)
BUN SERPL-MCNC: 21 MG/DL (ref 6–23)
CALCIUM SERPL-MCNC: 9.8 MG/DL (ref 8.6–10.3)
CHLORIDE SERPL-SCNC: 103 MMOL/L (ref 98–107)
CO2 SERPL-SCNC: 30 MMOL/L (ref 21–32)
CREAT SERPL-MCNC: 0.9 MG/DL (ref 0.5–1.3)
EGFRCR SERPLBLD CKD-EPI 2021: >90 ML/MIN/1.73M*2
EOSINOPHIL # BLD AUTO: 0.2 X10*3/UL (ref 0–0.7)
EOSINOPHIL NFR BLD AUTO: 2.5 %
ERYTHROCYTE [DISTWIDTH] IN BLOOD BY AUTOMATED COUNT: 11.7 % (ref 11.5–14.5)
EST. AVERAGE GLUCOSE BLD GHB EST-MCNC: 105 MG/DL
GLUCOSE SERPL-MCNC: 108 MG/DL (ref 74–99)
HBA1C MFR BLD: 5.3 %
HCT VFR BLD AUTO: 48.3 % (ref 41–52)
HGB BLD-MCNC: 16.8 G/DL (ref 13.5–17.5)
IMM GRANULOCYTES # BLD AUTO: 0.02 X10*3/UL (ref 0–0.7)
IMM GRANULOCYTES NFR BLD AUTO: 0.2 % (ref 0–0.9)
INR PPP: 1 (ref 0.9–1.1)
LYMPHOCYTES # BLD AUTO: 1.13 X10*3/UL (ref 1.2–4.8)
LYMPHOCYTES NFR BLD AUTO: 13.9 %
MCH RBC QN AUTO: 31.9 PG (ref 26–34)
MCHC RBC AUTO-ENTMCNC: 34.8 G/DL (ref 32–36)
MCV RBC AUTO: 92 FL (ref 80–100)
MONOCYTES # BLD AUTO: 0.58 X10*3/UL (ref 0.1–1)
MONOCYTES NFR BLD AUTO: 7.1 %
NEUTROPHILS # BLD AUTO: 6.14 X10*3/UL (ref 1.2–7.7)
NEUTROPHILS NFR BLD AUTO: 75.7 %
NRBC BLD-RTO: 0 /100 WBCS (ref 0–0)
PLATELET # BLD AUTO: 320 X10*3/UL (ref 150–450)
POTASSIUM SERPL-SCNC: 4.9 MMOL/L (ref 3.5–5.3)
PROT SERPL-MCNC: 6.8 G/DL (ref 6.4–8.2)
PROTHROMBIN TIME: 11 SECONDS (ref 9.8–12.4)
RBC # BLD AUTO: 5.26 X10*6/UL (ref 4.5–5.9)
SODIUM SERPL-SCNC: 138 MMOL/L (ref 136–145)
WBC # BLD AUTO: 8.1 X10*3/UL (ref 4.4–11.3)

## 2025-03-12 PROCEDURE — 3008F BODY MASS INDEX DOCD: CPT | Performed by: INTERNAL MEDICINE

## 2025-03-12 PROCEDURE — 93000 ELECTROCARDIOGRAM COMPLETE: CPT | Performed by: INTERNAL MEDICINE

## 2025-03-12 PROCEDURE — 99214 OFFICE O/P EST MOD 30 MIN: CPT | Performed by: INTERNAL MEDICINE

## 2025-03-12 PROCEDURE — 85025 COMPLETE CBC W/AUTO DIFF WBC: CPT

## 2025-03-12 PROCEDURE — 1036F TOBACCO NON-USER: CPT | Performed by: INTERNAL MEDICINE

## 2025-03-12 PROCEDURE — 85610 PROTHROMBIN TIME: CPT

## 2025-03-12 PROCEDURE — 83036 HEMOGLOBIN GLYCOSYLATED A1C: CPT

## 2025-03-12 PROCEDURE — 80053 COMPREHEN METABOLIC PANEL: CPT

## 2025-03-12 RX ORDER — DIPHENHYDRAMINE HCL 25 MG
25 TABLET ORAL DAILY
COMMUNITY

## 2025-03-12 NOTE — PROGRESS NOTES
CARDIOLOGY OFFICE VISIT      CHIEF COMPLAINT  Chief Complaint   Patient presents with    Pre-op Clearance     For R knee surgery with Dr MIGUELINA Waters on 2025        HISTORY OF PRESENT ILLNESS  The patient states that he has been doing well except for problems with his joints.  He is going to have right knee replacement surgery in the near future by Dr. Waters.  He denies chest discomfort or symptoms of myocardial ischemia.  He denies problems with dyspnea exertion.  He denies palpitations and syncope.  He denies any problems medication.    EKG: Normal sinus rhythm, within normal limits, results discussed with patient      IMPRESSION:   1. Mixed hyperlipidemia.  2. 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  3. Strong family history of coronary artery disease, father  at age 52 years from a myocardial infarction.  4. Overweight.  5. Coronary artery disease, no angina, CT coronary artery calcium score 57 May 2019  6. Obstructive sleep apnea  7. History of transient global amnesia     The patient's cardiac status is stable at this time.  He is a satisfactory risk for his upcoming knee surgery assuming routine preop lab work is good.      Please excuse any errors in grammar or translation related to this dictation. Voice recognition software was utilized to prepare this document.         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     Allergies:  Allergies   Allergen Reactions    Pollen Extracts Wheezing and Runny nose        Outpatient Medications:  Current Outpatient Medications   Medication Instructions    aspirin 81 mg EC tablet 1 tablet, Daily    atorvastatin (LIPITOR) 40 mg, oral, Daily    coenzyme Q-10 300 mg capsule capsule 1 capsule, Daily    diphenhydrAMINE (ALLERGY) 25 mg, Daily    HERBAL DRUGS ORAL 500 mg, Daily    meloxicam (MOBIC) 15 mg, oral, Daily    multivitamin tablet 1 tablet, Daily    mv-mn-iron-FA-herbal cmplx#190 (Vitamin D3 Complete) 18 mg iron-800 mcg-150 mg tablet 1 tablet, Daily    omega 3-dha-epa-fish oil (Fish OiL) 1,000 mg (120 mg-180 mg) capsule Take by mouth.    vit B complx/folic acid/lysine (B COMPLEX VITAMINS PO) Take by mouth.          REVIEW OF SYSTEMS  Review of Systems   All other systems reviewed and are negative.        VITALS  Vitals:    03/12/25 0809   BP: 140/78   Pulse: 70       PHYSICAL EXAM  Constitutional:       Appearance: Healthy appearance. Not in distress.   Eyes:      Conjunctiva/sclera: Conjunctivae normal.      Pupils: Pupils are equal, round, and reactive to light.   Neck:      Vascular: No JVR. JVD normal.   Pulmonary:      Effort: Pulmonary effort is normal.      Breath sounds: Normal breath sounds. No wheezing. No rhonchi. No rales.   Chest:      Chest wall: Not tender to palpatation.   Cardiovascular:      PMI at left midclavicular line. Normal rate. Regular rhythm. Normal S1. Normal S2.       Murmurs: There is no murmur.      No gallop.  No click. No rub.   Pulses:     Intact distal pulses.   Edema:     Peripheral edema absent.   Abdominal:      Tenderness: There is no abdominal tenderness.   Musculoskeletal: Normal range of motion.         General: No tenderness.      Cervical back: Normal range of motion. Skin:     General: Skin is warm and dry.   Neurological:      General: No focal deficit  present.      Mental Status: Alert and oriented to person, place and time.           ASSESSMENT AND PLAN  Diagnoses and all orders for this visit:  Preoperative clearance  Mixed hyperlipidemia  Family history of coronary artery disease  Coronary artery disease involving native heart, unspecified vessel or lesion type, unspecified whether angina present  Obstructive sleep apnea  BMI 27.0-27.9,adult  Never smoked cigarettes      [unfilled]      I,Indu Monroy LPN am scribing for, and in the presence of Dr. Jeovany Doty.    I, Dr. Jeovany Doty, personally performed the services described in the documentation as scribed by Indu Monroy LPN   in my presence, and confirm it is both accurate and complete.      Dr. Jeovany Noland MD  Thank you for allowing me to participate in the care of this patient. Please do not hesitate to contact me with any further questions or concerns.

## 2025-03-12 NOTE — PATIENT INSTRUCTIONS
Patient is an acceptable cardiac risk for upcoming hip surgery.  Please hold your ASA, Meloxicam and fish oil for 7 days prior to surgery and resume following.  After recovery from surgery, please monitor and record your B/P for a month and let the office know if your systolic number is consistently above 140.  Follow up office visit in 1 year.  Continue same medications/treatment.  Patient educated on proper medication use.  Patient educated on risk factor modification.  Please bring any lab results from other providers / physicians to your next appointment.    Please bring all medicines, vitamins and herbal supplements with you when you come to the office.    Prescriptions will not be filled unless you are compliant with your follow up appointments or have a follow up  appointment scheduled as per instruction of your physician.  Refills should be requested at the time of  Your visit.

## 2025-03-27 ENCOUNTER — APPOINTMENT (OUTPATIENT)
Dept: PRIMARY CARE | Facility: CLINIC | Age: 65
End: 2025-03-27
Payer: COMMERCIAL

## 2025-03-27 VITALS
SYSTOLIC BLOOD PRESSURE: 130 MMHG | TEMPERATURE: 96.8 F | WEIGHT: 201 LBS | HEART RATE: 81 BPM | HEIGHT: 72 IN | DIASTOLIC BLOOD PRESSURE: 62 MMHG | BODY MASS INDEX: 27.22 KG/M2

## 2025-03-27 DIAGNOSIS — I25.10 CORONARY ARTERY DISEASE INVOLVING NATIVE HEART, UNSPECIFIED VESSEL OR LESION TYPE, UNSPECIFIED WHETHER ANGINA PRESENT: ICD-10-CM

## 2025-03-27 DIAGNOSIS — M17.11 PRIMARY OSTEOARTHRITIS OF RIGHT KNEE: Primary | ICD-10-CM

## 2025-03-27 DIAGNOSIS — G47.33 OBSTRUCTIVE SLEEP APNEA: ICD-10-CM

## 2025-03-27 DIAGNOSIS — L60.0 INGROWN TOENAIL: ICD-10-CM

## 2025-03-27 PROCEDURE — 3008F BODY MASS INDEX DOCD: CPT | Performed by: FAMILY MEDICINE

## 2025-03-27 PROCEDURE — 1036F TOBACCO NON-USER: CPT | Performed by: FAMILY MEDICINE

## 2025-03-27 PROCEDURE — 99215 OFFICE O/P EST HI 40 MIN: CPT | Performed by: FAMILY MEDICINE

## 2025-03-27 RX ORDER — CELECOXIB 200 MG/1
200 CAPSULE ORAL 2 TIMES DAILY
Qty: 60 CAPSULE | Refills: 0 | Status: SHIPPED | OUTPATIENT
Start: 2025-03-27 | End: 2025-04-26

## 2025-03-27 ASSESSMENT — PATIENT HEALTH QUESTIONNAIRE - PHQ9
2. FEELING DOWN, DEPRESSED OR HOPELESS: NEARLY EVERY DAY
5. POOR APPETITE OR OVEREATING: NOT AT ALL
10. IF YOU CHECKED OFF ANY PROBLEMS, HOW DIFFICULT HAVE THESE PROBLEMS MADE IT FOR YOU TO DO YOUR WORK, TAKE CARE OF THINGS AT HOME, OR GET ALONG WITH OTHER PEOPLE: SOMEWHAT DIFFICULT
3. TROUBLE FALLING OR STAYING ASLEEP OR SLEEPING TOO MUCH: NEARLY EVERY DAY
9. THOUGHTS THAT YOU WOULD BE BETTER OFF DEAD, OR OF HURTING YOURSELF: NOT AT ALL
1. LITTLE INTEREST OR PLEASURE IN DOING THINGS: NEARLY EVERY DAY
SUM OF ALL RESPONSES TO PHQ9 QUESTIONS 1 AND 2: 6
7. TROUBLE CONCENTRATING ON THINGS, SUCH AS READING THE NEWSPAPER OR WATCHING TELEVISION: NEARLY EVERY DAY
SUM OF ALL RESPONSES TO PHQ QUESTIONS 1-9: 18
6. FEELING BAD ABOUT YOURSELF - OR THAT YOU ARE A FAILURE OR HAVE LET YOURSELF OR YOUR FAMILY DOWN: NOT AT ALL
4. FEELING TIRED OR HAVING LITTLE ENERGY: NEARLY EVERY DAY
8. MOVING OR SPEAKING SO SLOWLY THAT OTHER PEOPLE COULD HAVE NOTICED. OR THE OPPOSITE, BEING SO FIGETY OR RESTLESS THAT YOU HAVE BEEN MOVING AROUND A LOT MORE THAN USUAL: NEARLY EVERY DAY

## 2025-03-27 NOTE — PROGRESS NOTES
Pre admit   R Knee replacement, 4/8, [facility], Dr Kurtz     Already had EKG done

## 2025-03-27 NOTE — PROGRESS NOTES
Patient is here for preadmission clearance.  He already has clearance from cardiology.  He is already had multiple surgeries in the right knee and now is having replacement.  Is also having worsening overall generalized arthritis.  Number that his dad was so bad passed away in his 50s.  Multiple other family members with very bad arthritis.  He had seen rheumatology on 2 years ago and did not do well on the meloxicam but does do well on twice a day 440 mg Aleve.  They had thought that possibly he did have PMR.  He is interested in revisiting after his surgery.  He also has issues with his left great toe and wanted that looked at as well.  It is painful and he is still toed be boots and some other shoes.  He did vomit after anesthesia once but otherwise has not had any problems.  No false teeth bridges or partials.  No glasses or contacts    REVIEW OF SYSTEMS: 12 systems negative except for those mentioned in the HPI. Reviewed home risks with patient. Patient feels safe at home.    PHYSICAL EXAMINATION:   Constitutional: The patient is alert and oriented x3, in no acute  distress.  Eyes: Extraocular movements are intact. Pupils are equal and reactive to light  ENT: Bilateral TMs within normal limits. Nasal turbinates are within normal limits. Throat within normal limits.  Mallampati score of 1  Neck: Supple without lymphadenopathy or JVD.  Heart: Regular rate and rhythm without murmur, click, gallop, or rub.  Lungs: Clear to auscultation bilaterally. No rales, rhonchi, or  wheezing.  Abdomen: Soft, nontender, nondistended. Normoactive bowel sounds.   No palpable masses. Normal percussion  Musculoskeletal: 5/5 motor, upper and lower extremities.  Neurologic: Cranial nerves II through XII fully intact. +2/4 DTRs  in ankle and knee.  Psychiatric: Good judgment and insight. Normal affect and mood. No cognitive defects noted.  Lymphatic: Negative as noted above.  Skin: Thickening with decreased nail of the left great toe  on the medial aspect no acute inflammation    Assessment:  Per EMR    Plan:  Patient is cleared with low risk for surgery.  Discussed switching over to Celebrex with some of the neurosurgeons are fine with having all the way up to the day of surgery.  He can talk with his orthopedist to see if they would allow that versus just simply Tylenol.  After the knee surgery would definitely wish to refollow-up with rheumatology and he does seem to see a great improvement weeks not wintertime and cold.  If everything resolves he may not wish to do and is going to retire in a year and a half.  EKG cardiology note was reviewed this will be forwarded to the surgeon will follow-up after surgery and also can consider removing the ingrown toenail  Discussed with the patient and reviewed annual wellness questionnaire form as well as cognitive deficits. Also discussed with the patient immunizations and risks for colonoscopy and other screening procedures    This dictation was created using Dragon dictation and may contain errors

## 2025-04-03 ENCOUNTER — OFFICE VISIT (OUTPATIENT)
Dept: ORTHOPEDIC SURGERY | Facility: CLINIC | Age: 65
End: 2025-04-03
Payer: COMMERCIAL

## 2025-04-03 DIAGNOSIS — M17.11 PRIMARY OSTEOARTHRITIS OF RIGHT KNEE: ICD-10-CM

## 2025-04-03 DIAGNOSIS — Z96.651 STATUS POST TOTAL RIGHT KNEE REPLACEMENT: Primary | ICD-10-CM

## 2025-04-03 PROCEDURE — 99211 OFF/OP EST MAY X REQ PHY/QHP: CPT | Performed by: PHYSICIAN ASSISTANT

## 2025-04-03 PROCEDURE — L1830 KO IMMOB CANVAS LONG PRE OTS: HCPCS | Performed by: PHYSICIAN ASSISTANT

## 2025-04-03 RX ORDER — CHLORHEXIDINE GLUCONATE ORAL RINSE 1.2 MG/ML
SOLUTION DENTAL
Qty: 30 ML | Refills: 0 | Status: SHIPPED | OUTPATIENT
Start: 2025-04-03

## 2025-04-03 RX ORDER — ONDANSETRON 4 MG/1
4 TABLET, FILM COATED ORAL EVERY 8 HOURS PRN
Qty: 20 TABLET | Refills: 0 | Status: SHIPPED | OUTPATIENT
Start: 2025-04-03 | End: 2025-04-10

## 2025-04-03 RX ORDER — ACETAMINOPHEN 325 MG/1
650 TABLET ORAL EVERY 6 HOURS PRN
Qty: 42 TABLET | Refills: 0 | Status: SHIPPED | OUTPATIENT
Start: 2025-04-03

## 2025-04-03 RX ORDER — CYCLOBENZAPRINE HCL 10 MG
10 TABLET ORAL 3 TIMES DAILY PRN
Qty: 21 TABLET | Refills: 0 | Status: SHIPPED | OUTPATIENT
Start: 2025-04-03 | End: 2025-04-10

## 2025-04-03 RX ORDER — OXYCODONE HYDROCHLORIDE 5 MG/1
5 TABLET ORAL EVERY 6 HOURS PRN
Qty: 28 TABLET | Refills: 0 | Status: SHIPPED | OUTPATIENT
Start: 2025-04-03 | End: 2025-04-10

## 2025-04-03 RX ORDER — CHLORHEXIDINE GLUCONATE 40 MG/ML
SOLUTION TOPICAL
Qty: 473 ML | Refills: 0 | Status: SHIPPED | OUTPATIENT
Start: 2025-04-03

## 2025-04-03 RX ORDER — ASPIRIN 81 MG/1
81 TABLET ORAL 2 TIMES DAILY
Qty: 60 TABLET | Refills: 0 | Status: SHIPPED | OUTPATIENT
Start: 2025-04-03 | End: 2025-05-03

## 2025-04-03 RX ORDER — DOCUSATE SODIUM 100 MG/1
100 CAPSULE, LIQUID FILLED ORAL 2 TIMES DAILY PRN
Qty: 60 CAPSULE | Refills: 0 | Status: SHIPPED | OUTPATIENT
Start: 2025-04-03

## 2025-04-03 NOTE — PROGRESS NOTES
History and Physical      CHIEF COMPLAINT: Right knee pain    HISTORY OF PRESENT ILLNESS:      The patient is a64 y.o. male with significant past medical history of right knee pain Long history of osteoarthritis and remote history of ACL reconstruction.  He has not exhausted conservative measures after risk benefits alternatives were discussed patient likes to proceed with right total knee replacement.      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     History of transposition of great arteries    Personal history of other diseases of the nervous system and sense organs     History of sleep apnea    Sleep apnea         Past Surgical History:    Past Surgical History:   Procedure Laterality Date    OTHER SURGICAL HISTORY  03/29/2022    Complete colonoscopy    OTHER SURGICAL HISTORY  03/29/2022    Knee surgery    OTHER SURGICAL HISTORY  03/29/2022    Shoulder surgery       Medications Prior to Admission:    Current Outpatient Medications on File Prior to Visit   Medication Sig Dispense Refill    aspirin 81 mg EC tablet Take 1 tablet (81 mg) by mouth once daily.      atorvastatin (Lipitor) 40 mg tablet TAKE ONE TABLET BY MOUTH EVERY DAY 90 tablet 3    celecoxib (CeleBREX) 200 mg capsule Take 1 capsule (200 mg) by mouth 2 times a day. 60 capsule 0    coenzyme Q-10 300 mg capsule capsule Take 1 capsule (300 mg) by mouth once daily.      diphenhydrAMINE (Allergy) 25 mg tablet Take 1 tablet (25 mg) by mouth early in the morning..      HERBAL DRUGS ORAL Take 500 mg by mouth once daily. TUMERIC      meloxicam (Mobic) 15 mg tablet Take 1 tablet (15 mg) by mouth once daily. 90 tablet 0    multivitamin tablet Take 1 tablet by mouth once daily.      mv-mn-iron-FA-herbal cmplx#190 (Vitamin D3 Complete) 18 mg iron-800 mcg-150 mg tablet Take 1 tablet by mouth once daily.      omega 3-dha-epa-fish oil (Fish OiL) 1,000  mg (120 mg-180 mg) capsule Take by mouth.      vit B complx/folic acid/lysine (B COMPLEX VITAMINS PO) Take by mouth.       No current facility-administered medications on file prior to visit.        Allergies:  Pollen extracts    Social History:   Social History     Socioeconomic History    Marital status:      Spouse name: Not on file    Number of children: Not on file    Years of education: Not on file    Highest education level: Not on file   Occupational History    Not on file   Tobacco Use    Smoking status: Never     Passive exposure: Never    Smokeless tobacco: Never   Vaping Use    Vaping status: Never Used   Substance and Sexual Activity    Alcohol use: Not Currently     Alcohol/week: 8.0 standard drinks of alcohol     Types: 8 Cans of beer per week    Drug use: Not Currently    Sexual activity: Yes     Partners: Female     Birth control/protection: Male Sterilization   Other Topics Concern    Not on file   Social History Narrative    Not on file     Social Drivers of Health     Financial Resource Strain: Not on file   Food Insecurity: Not on file   Transportation Needs: Not on file   Physical Activity: Not on file   Stress: Not on file   Social Connections: Not on file   Intimate Partner Violence: Not on file   Housing Stability: Not on file       Family History:   Family History   Problem Relation Name Age of Onset    Heart attack Father Mark         Review of systems: Noncontributory for orthopedics    Vitals:  There were no vitals taken for this visit.    Physical examination:  Head normocephalic atraumatic  Heart regular rate and rhythm  Lungs clear  Abdominal exam nontender nondistended  Extremity: Right knee well-healed incision from previous surgery mild positive effusion slight valgus deformity current range of motion is 0 to 115 degrees    Impression : Right knee degenerative joint disease with hardware from ACL reconstruction      Plan:  Scheduled for right total knee replacement removal  of ACL hardware    Risk and benefits of surgery discussed extensively with the patient.    Surgical risk included but were not limited to infection, wear, loosening, need for further surgery blood clot, failure to heal, failure of the surgery, stiffness, loss of limb life, extremity function change in length change, and associated risks of surgery during the coronavirus epidemic.    Risk with any surgery including arthroplasty and replacements include but are not limited to:       Wear, loosening, infection, blood clot, DVT, loss of limb, life, delayed recovery, limb length change, instability, dislocation, discomfort with new implant and failure of the procedure.

## 2025-04-08 ENCOUNTER — OUTSIDE PROCEDURE (OUTPATIENT)
Dept: ORTHOPEDIC SURGERY | Facility: CLINIC | Age: 65
End: 2025-04-08
Payer: COMMERCIAL

## 2025-04-08 PROCEDURE — 27447 TOTAL KNEE ARTHROPLASTY: CPT | Performed by: PHYSICIAN ASSISTANT

## 2025-04-08 PROCEDURE — 27447 TOTAL KNEE ARTHROPLASTY: CPT | Performed by: ORTHOPAEDIC SURGERY

## 2025-04-08 NOTE — PROGRESS NOTES
Preop diagnosis: Right knee osteoarthritis and painful hardware    Postop diagnosis: Right knee osteoarthritis and painful hardware    Procedure:  Right total knee replacement 72441    Right knee removal of deep hardware or implant 90477    Surgeon: Rajendra Vazquez  Assistant:  Rajendra Alvarez physician assistant (PAC) was required and present throughout the entire case.  Given the nature of the disease process and the procedure, a skilled surgical first assistant was necessary during the entire case.  The assistant was necessary to hold retractors and manipulate extremity during the procedure.  A certified scrub tech was also present at the back table managing instruments with supplies for the surgical case    Anesthesia: Spinal with regional      Blood loss: Minimal    Fluids: Less than 2 L       Indications: Right knee severe pain advancing deformity painful hardware failing conservative treatment now for total knee replacement and hardware removal      Summation of event:    Operative Report:       Procedure right total knee replacement    Components size:    Femoral size cemented Maddie persona 8     Tibial size cemented Maddie persona G    Patella size cemented 35 mm    Courtney size 16 mm PS    Alignment varus    Summation of surgical procedure    The patient was taken to the operating room and surgical timeout performed.  The patient received preoperative antibiotics.  After satisfactory anesthetic the appropriate knee was prepped and draped in the usual sterile fashion.  A medial prepatellar incision was made and dissection carried sharply through the skin and underlying fat to the fascial tissue.  A medial capsular incision was made and the joint entered.  There was a modest amount of synovial fluid.  Synovium was hypertrophic.    A drill hole was made in the center of the femur and the intramedullary guide inserted.  The cutting jig was inserted with 3° of external rotation and  the distal femoral cut was made.  The jig the rods were removed and the appropriate size template inserted.  The anterior and posterior condyles were cut with an oscillating saw, and the chamfering cuts were made.  Final preparation of the femur was completed and this allowed for translating the tibia anteriorly.    At this time we could see part of the staple on the medial femoral condyle it was protruding near the chamfer cuts.  Using a osteotome and rongeur we easily removed the staple without any injury to the MCL or medial femoral condyle.    A drill hole was made in the proximal tibia just in line with the old ACL footprint.  An intramedullary guide was used in the tibia and a cut was made 2 mm below the worn tibial surface after guidepins were placed and the tibial cutting guide was fitted on the anterior tibia with approximately 3° of slope posteriorly.    After the tibia was cut appropriate sizing of the flexion-extension gaps was measured Intra-Op, adjustments were made as needed to correct the gaps so that they were symmetric in both flexion and extension.  We then trialed the appropriate size tibia on the proximal tibia and finish tibial preparation first with a punch after insertion of the central reamer.    Trial femur and tibial components were inserted and we selected and trialed the appropriate poly iner.  This was used to control stability which was assessed in both flexion extension and the varus valgus plane.    With the knee in full extension the patella preparation was completed we reamed the patella with the appropriate size reamer to leave a residual 14-15 mm of natural patella.  Final patella preparation included 3 drill holes and patellar tracking was assessed.    Trials were inserted, appropriate sizes were determined and ligament balance was performed.  A batch of cement was mixed after lavage and careful bone drying, procedures were inserted and held in full extension until the cement  had hardened with a patella clamp in place.  The ligaments were checked and stable.  The wound was then closed in layers using #1 Vicryl to close the arthrotomy and a figure-of-eight manner, inverted interrupted 2-0 Vicryl the subcutaneous tissues, a running 3-0 Monocryl in the subcutaneous and skin layers compressive dressing was then applied and a bulky sterile fashion.  The patient tolerated the procedure well and returned to the post anesthesia recovery room in satisfactory condition.  Postop  operative x-ray was reviewed and the findings

## 2025-04-10 ENCOUNTER — EVALUATION (OUTPATIENT)
Dept: PHYSICAL THERAPY | Facility: CLINIC | Age: 65
End: 2025-04-10
Payer: COMMERCIAL

## 2025-04-10 DIAGNOSIS — M17.11 PRIMARY OSTEOARTHRITIS OF RIGHT KNEE: ICD-10-CM

## 2025-04-10 DIAGNOSIS — Z96.651 STATUS POST TOTAL RIGHT KNEE REPLACEMENT: ICD-10-CM

## 2025-04-10 PROCEDURE — 97162 PT EVAL MOD COMPLEX 30 MIN: CPT | Mod: GP | Performed by: GENERAL ACUTE CARE HOSPITAL

## 2025-04-10 PROCEDURE — 97110 THERAPEUTIC EXERCISES: CPT | Mod: GP | Performed by: GENERAL ACUTE CARE HOSPITAL

## 2025-04-10 NOTE — LETTER
April 10, 2025    Abelino Britton, TREMAINE  [address] [facility]   [facility], [address] [address]    Patient: Reza Mendez   YOB: 1960   Date of Visit: 4/10/2025       Dear Rajendra Alvarez PA-C  [address] [facility], [address] [place] [address],  [place] [address]    The attached plan of care is being sent to you because your patient’s medical reimbursement requires that you certify the plan of care. Your signature is required to allow uninterrupted insurance coverage.      You may indicate your approval by signing below and faxing this form back to us at Dept Fax: [phone].    Please call Dept: [phone] with any questions or concerns.    Thank you for this referral,        Abelino Britton, TREMAINE  [address] [address]    Payer: Payor: [employer] [place] / Plan: [employer] / Product Type: *No Product type* /                                                                         Date:     Dear Abelino Britton, PT,     Re: Mr. Mark Mendez, MRN:[id number]    I certify that I have reviewed the attached plan of care and it is medically necessary for Mr. Mark Mendez (1960) who is under my care.          ______________________________________                    _________________  Provider name and credentials                                           Date and time                                                                                           Plan of Care 4/10/25   Effective from: 4/10/2025  Effective to: 7/9/2025    Plan ID: [id number]            Participants as of Finalize on 4/10/2025    Name Type Comments Contact Info    Rajendra Alvarez PA-C Referring Provider  [phone]    Ableino Britton PT Physical Therapist  [phone]       Last Plan Note     Author: Abelino Britton PT Status: Incomplete Last edited: 4/10/2025  9:00 AM       Patient Name: Mark  "Mendez \"Reza\"   MRN: [id number]  Today's Date: 4/10/2025       Current Problem:  1. Primary osteoarthritis of right knee  Referral to Physical Therapy      2. 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, hiking and boating   Prior Treatment and results of Prior treatment: PT injections and cartilage supplement   Constitutional Symptoms: Patient Denies   Fever Chills Nausea Vomiting Loss of appetite Abdominal pain Change in bowel function Weight loss or gain Headache Unexplained fatigue Malaise Lethargy Weakness Arthralgia  Myalgia  Difficulty in sleeping Breathing trouble  Barriers to treatment/ learning: none     Gait: decreased Wbing on right, step to with walker  Observation: edema right leg, covered with antibiotic bandage, no visual sign of infection  Deep Tendon Reflexes 2+ bilateral and symmetrical        Patella       Achilles    AROM (degrees)  Knee AROM R/L: 66-22-0/  134-2    Strength:  Knee Extension R/L:  NT#/  5/5  Knee Flexion R/L:  NT#/  5/5       Functional Tests (R/L):     SLS   NT     Squat  unable     Stairs not doing     LEFS: 13    Evaluation, Tests and measures, Education including HEP instruction and feedback. Patient appears to be compliant and motivated to perform HEP as instructed and participate in active physical therapy as indicated. The patient was educated on: the importance of positioning, proper posture, and body mechanics, " joint mechanics and pathology, general tissue healing time, the appropriate use of heat and cold to control pain and inflammation, the importance of general therapeutic exercise, especially to stay within pain-free ROM, specific anatomy, function, & regional interdependence of involved areas, & likely cause of impairments & POC.  Patient's questions were answered to their satisfaction, & patient verbalized understanding & agreement with POC.  The patient presents to Physical Therapy with signs and symptoms consistent with the medical diagnosis of knee OA sp TKA 4/8/25. Key impairments include:   pain decreased strength ROM and difficulty with functional activities such as walking stairs activity tolerance. Skilled PT is required to address these key impairments and to provide and progress with an appropriate home exercise program. This evaluation is of  mod complexity due to the nature of the patient's presentation as well as the comorbidities and medical factors included in this evaluation.    Access Code: [email or web address]  URL: [email or web address]  Date: 04/10/2025  Prepared by: Abelino Britton    Exercises  - Supine Knee Extension Strengthening  - 3 x daily - 7 x weekly - 10 reps  - Supine Ankle Pumps  - 3 x daily - 7 x weekly - 10 reps  - Supine Quad Set  - 3 x daily - 7 x weekly - 10 reps  - Supine Gluteal Sets  - 3 x daily - 7 x weekly - 10 reps  - Seated Long Arc Quad  - 3 x daily - 7 x weekly - 10 reps  - Seated Heel Slide  - 3 x daily - 7 x weekly - 10 reps  - Seated Hamstring Stretch  - 3 x daily - 7 x weekly - 1 reps - 30 hold  - Seated Heel Raise  - 3 x daily - 7 x weekly - 20 reps  - Seated Toe Raise  - 3 x daily - 7 x weekly - 20 reps  - Seated Calf Stretch with Strap  - 3 x daily - 7 x weekly - 1 reps - 30 hold    Treatment may include: Therapeutic Exercise (PROM, AA/AROM, Flexibility, Strengthening, Stabilization, HEP instruction). Therapeutic Activities (Transfers, Body Mechanics,  Work Related Activities, Closed Chain, Agility and Power).   Manual Techniques (Soft tissue Mobilization, Joint mobilization/Distraction, Muscle Energy Techniques, Lymphatic Drainage, Dry Needling).  Neuromuscular Reeducation (Postural Training, Balance/Proprioception, Relaxation Techniques). Biofeedback. Aquatic Exercise. Modalities: Ultrasound, Moist Heat, Cryotherapy, Vasopneumatic with or without Cryotherapy. Electrical Stimulation (TENS/IFC/ Pre modulated for pain relief, NMES for Muscle reeducation). Gait Training. Orthotic Fit and Training. Strapping, Kinesio taping.     Patient will report resting pain on Visual Analog Scale < or =0  .   Pain at worst will be < or = 0 .   Pain with (patients primary complaint) will be < or =  0.    Patient will have improved knee strength >/=  5/5, knee flexion >/=  5/5 to improve joint mechanics with functional tasks.    Knee Range of motion will improve knee Flexion>/=  130 , Extension >/= 0   to improve joint mechanics with functional activities.    Lower Extremity Functional Scale (LEFS) score will improve >/= 9 points to demonstrate overall improved functional abilities.    Patient will demonstrate minimal or no gait deviation with ambulating >/=  30 minutes, all surfaces, up/down stairs reciprocally.    Patient will correctly perform home exercise program Independently, demonstrated through patient teach back upon discharge.         Current Participants as of 4/10/2025    Name Type Comments Contact Info    Rajendra Alvarez PA-C Referring Provider  [phone]    Signature pending    Abelino Britton PT Physical Therapist  [phone]    Signature pending

## 2025-04-10 NOTE — PROGRESS NOTES
"Patient Name: Mark Mendez \"Reza\" \"Reza\"   MRN: [id number]  Today's Date: 4/10/2025  Time Calculation  Start Time: 0900  Stop Time: 0950  Time Calculation (min): 50 min    Current Problem:  1. Primary osteoarthritis of right knee  Referral to Physical Therapy    Follow Up In Physical Therapy      2. 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, can make calls until cleared to drive, hiking and boating   Prior Treatment and results of Prior treatment: PT injections and cartilage supplement   Constitutional Symptoms: Patient Denies   Fever Chills Nausea Vomiting Loss of appetite Abdominal pain Change in bowel function Weight loss or gain Headache Unexplained fatigue Malaise Lethargy Weakness Arthralgia  Myalgia  Difficulty in sleeping Breathing trouble  Barriers to treatment/ learning: none     Gait: decreased Wbing on right, step to with walker  Observation: edema right leg, covered with antibiotic bandage, no visual sign of infection  Deep Tendon Reflexes 2+ bilateral and symmetrical        Patella       Achilles    AROM (degrees)  Knee AROM R/L: 66-22-0/  134-2    Strength:  Knee Extension R/L:  NT#/  5/5  Knee Flexion R/L:  NT#/  5/5       Functional Tests (R/L):     SLS   NT     Squat  unable     Stairs not doing     LEFS: 13    Evaluation, Tests and measures, Education including HEP instruction and feedback. Patient appears to be compliant and motivated to " perform HEP as instructed and participate in active physical therapy as indicated. The patient was educated on: the importance of positioning, proper posture, and body mechanics, joint mechanics and pathology, general tissue healing time, the appropriate use of heat and cold to control pain and inflammation, the importance of general therapeutic exercise, especially to stay within pain-free ROM, specific anatomy, function, & regional interdependence of involved areas, & likely cause of impairments & POC.  Patient's questions were answered to their satisfaction, & patient verbalized understanding & agreement with POC.  The patient presents to Physical Therapy with signs and symptoms consistent with the medical diagnosis of knee OA sp TKA 4/8/25. Key impairments include:   pain decreased strength ROM and difficulty with functional activities such as walking stairs activity tolerance. Skilled PT is required to address these key impairments and to provide and progress with an appropriate home exercise program. This evaluation is of  mod complexity due to the nature of the patient's presentation as well as the comorbidities and medical factors included in this evaluation.    Access Code: [email or web address]  URL: [email or web address]  Date: 04/10/2025  Prepared by: Abelino Britton    Exercises  - Supine Knee Extension Strengthening  - 3 x daily - 7 x weekly - 10 reps  - Supine Ankle Pumps  - 3 x daily - 7 x weekly - 10 reps  - Supine Quad Set  - 3 x daily - 7 x weekly - 10 reps  - Supine Gluteal Sets  - 3 x daily - 7 x weekly - 10 reps  - Seated Long Arc Quad  - 3 x daily - 7 x weekly - 10 reps  - Seated Heel Slide  - 3 x daily - 7 x weekly - 10 reps  - Seated Hamstring Stretch  - 3 x daily - 7 x weekly - 1 reps - 30 hold  - Seated Heel Raise  - 3 x daily - 7 x weekly - 20 reps  - Seated Toe Raise  - 3 x daily - 7 x weekly - 20 reps  - Seated Calf Stretch with Strap  - 3 x daily - 7 x weekly - 1 reps - 30  hold    Treatment may include: Therapeutic Exercise (PROM, AA/AROM, Flexibility, Strengthening, Stabilization, HEP instruction). Therapeutic Activities (Transfers, Body Mechanics, Work Related Activities, Closed Chain, Agility and Power).   Manual Techniques (Soft tissue Mobilization, Joint mobilization/Distraction, Muscle Energy Techniques, Lymphatic Drainage, Dry Needling).  Neuromuscular Reeducation (Postural Training, Balance/Proprioception, Relaxation Techniques). Biofeedback. Aquatic Exercise. Modalities: Ultrasound, Moist Heat, Cryotherapy, Vasopneumatic with or without Cryotherapy. Electrical Stimulation (TENS/IFC/ Pre modulated for pain relief, NMES for Muscle reeducation). Gait Training. Orthotic Fit and Training. Strapping, Kinesio taping.     Patient will report resting pain on Visual Analog Scale < or =0  .   Pain at worst will be < or = 0 .   Pain with (patients primary complaint) will be < or =  0.    Patient will have improved knee strength >/=  5/5, knee flexion >/=  5/5 to improve joint mechanics with functional tasks.    Knee Range of motion will improve knee Flexion>/=  130 , Extension >/= 0   to improve joint mechanics with functional activities.    Lower Extremity Functional Scale (LEFS) score will improve >/= 9 points to demonstrate overall improved functional abilities.    Patient will demonstrate minimal or no gait deviation with ambulating >/=  30 minutes, all surfaces, up/down stairs reciprocally.    Patient will correctly perform home exercise program Independently, demonstrated through patient teach back upon discharge.

## 2025-04-14 ENCOUNTER — TREATMENT (OUTPATIENT)
Dept: PHYSICAL THERAPY | Facility: CLINIC | Age: 65
End: 2025-04-14
Payer: COMMERCIAL

## 2025-04-14 DIAGNOSIS — Z96.651 STATUS POST TOTAL RIGHT KNEE REPLACEMENT: ICD-10-CM

## 2025-04-14 DIAGNOSIS — M17.11 PRIMARY OSTEOARTHRITIS OF RIGHT KNEE: ICD-10-CM

## 2025-04-14 PROCEDURE — 97110 THERAPEUTIC EXERCISES: CPT | Mod: GP | Performed by: GENERAL ACUTE CARE HOSPITAL

## 2025-04-14 PROCEDURE — 97140 MANUAL THERAPY 1/> REGIONS: CPT | Mod: GP | Performed by: GENERAL ACUTE CARE HOSPITAL

## 2025-04-14 NOTE — PROGRESS NOTES
"Patient Name: aMrk Mendez \"Reza\"  MRN: [id number]  Today's Date: 4/14/2025  Time Calculation  Start Time: 1133  Stop Time: 1230  Time Calculation (min): 57 min  Current Problem:  1. Primary osteoarthritis of right knee  Follow Up In Physical Therapy      2. Status post total right knee replacement  Follow Up In Physical Therapy          Visit 2/20    Subjective:  Change in pain/ pain level: 6/10  Patient comments: doing the exercises as instructed   Notes increased swelling in the calf.       Objective: knee flex 75 deg   Homans-  calf cool with no palpable cording  Treatment:    Therapeutic exercise (63337):    Nustep 5 min  HS/GS/knee flex  Tiltboard  Minisquat  HS seated  LAQ  SAQ  Manual Therapy (88440):  Patella mobs proximal-distal, distal-proximal, lateral diagonals into stiffness  Tibial Femoral A-P flexion and A-P extension multiple knee flexion angles  Stretching into flexion and extension  DTM quadriceps, ITBand/ Hamstring     Assessment:  Patient notes improved pain levels resultant from activities in therapy session. Improved tissue quality noted as well as body mechanics demonstrated after cueing and corrections. Patient continues to require active therapy to progress functional capabilities with decreasing pain levels and improving mechanics with functional activities including progression of Home exercise program. Tolerance to today's treatment was good.   Patient appears motivated and compliant this date, demonstrating a working understanding of principals instructed and home program.    Plan:  Progress with functional strength as tolerated with respect to tissue healing. Manual therapy PRN to improve/maintain ROM, prevent adhesion, reduce pain.  "

## 2025-04-16 ENCOUNTER — TREATMENT (OUTPATIENT)
Dept: PHYSICAL THERAPY | Facility: CLINIC | Age: 65
End: 2025-04-16
Payer: COMMERCIAL

## 2025-04-16 DIAGNOSIS — Z96.651 STATUS POST TOTAL RIGHT KNEE REPLACEMENT: ICD-10-CM

## 2025-04-16 DIAGNOSIS — M17.11 PRIMARY OSTEOARTHRITIS OF RIGHT KNEE: ICD-10-CM

## 2025-04-16 PROCEDURE — 97140 MANUAL THERAPY 1/> REGIONS: CPT | Mod: GP | Performed by: GENERAL ACUTE CARE HOSPITAL

## 2025-04-16 PROCEDURE — 97110 THERAPEUTIC EXERCISES: CPT | Mod: GP | Performed by: GENERAL ACUTE CARE HOSPITAL

## 2025-04-16 RX ORDER — OXYCODONE HYDROCHLORIDE 5 MG/1
5 TABLET ORAL EVERY 6 HOURS PRN
Qty: 28 TABLET | Refills: 0 | Status: SHIPPED | OUTPATIENT
Start: 2025-04-16 | End: 2025-04-23

## 2025-04-16 NOTE — PROGRESS NOTES
"Patient Name: Mark Mendez \"Reza\"  MRN: [id number]  Today's Date: 4/16/2025  Time Calculation  Start Time: 0858  Stop Time: 0900  Time Calculation (min): 2 min  Current Problem:  1. Primary osteoarthritis of right knee  Follow Up In Physical Therapy      2. Status post total right knee replacement  Follow Up In Physical Therapy          Visit 3/20    Subjective:  Change in pain/ pain level: 5/10  Patient comments: stiff and sore  today. Doing a lot more, showered went up/down stairs, doing HEP, and took at least 30 laps around the house     Objective: knee flexion 67 deg AROM    Treatment:    Therapeutic exercise (24273):    Nustep 5 min  HS/GS/knee flex 60 sec  Tiltboard L2 x20 F/L   Minisquat x15  Heel raise x20  Step up 4 in x10  HS seated  LAQ  SAQ  PROM- on shoulder   Manual Therapy (59884):    Edema massage, on shoulder  Patella mobs proximal-distal, distal-proximal, lateral diagonals into stiffness  Tibial Femoral A-P flexion and A-P extension multiple knee flexion angles  Stretching into flexion and extension  DTM quadriceps, ITBand/ Hamstring     CP elevated NC    Assessment:  Increased stiffness into knee flexion this date Patient notes improved pain levels resultant from activities in therapy session. Improved tissue quality noted as well as body mechanics demonstrated after cueing and corrections. Patient continues to require active therapy to progress functional capabilities with decreasing pain levels and improving mechanics with functional activities including progression of Home exercise program. Tolerance to today's treatment was good. Muscle fatigue noted.  Patient appears motivated and compliant this date, demonstrating a working understanding of principals instructed and home program.    Plan:  Progress with functional strength as tolerated with respect to tissue healing. Manual therapy PRN to improve/maintain ROM, prevent adhesion, reduce pain.  "

## 2025-04-19 NOTE — PROGRESS NOTES
"Physical Therapy Treatment      Patient Name: Mark Mendez \"Reza\"  MRN: [id number]  Today's Date: 4/21/2025  Visit #4  Time Calculation  Start Time: 0840  Stop Time: 0950  Time Calculation (min): 70 min    Insurance:  2025 // 0% coins, $3200 ded ($973.96 met), $3200 oop ($973.96 met), no copay, 40v PT/OT jena yr (0v used as of 3/24/2025), no PA    [employer]  [address] not covered.    Assessment:  Reza is being treated s/p TKA 4/8/2025. Continued heavy manual soft tissue and joint mobility to reduce pain and increase ROM. He complains of pain in the anterior-medial knee throughout that is worse with knee flexion. He was able to tolerate all exercises prescribed but continues to demonstrate limited mobility in the sagittal plane, as well as pain limiting factors impacting strength gains.   Pt will continue to benefit from skilled PT interventions to address deficits and return to prior functional levels. Treatment includes: manual techniques to decrease pain and improve joint/soft-tissue mobility, as well as exercises to increase strength, endurance, and neuromotor control. Home exercise program has been updated to augment this session.    Plan:  Continue per protocol. View incision site, initiate scar mobilization NV.     Current Problem:   1. Primary osteoarthritis of right knee  Follow Up In Physical Therapy      2. Status post total right knee replacement  Follow Up In Physical Therapy          Subjective   Reza states he is in a lot of pain upon arrival. He states that most of his pain is in the anterior-medial portion of his knee. He uses k-tape during exercises that seems to help. His current pain level 7/10, his pain level upon DC is 8/10.     Objective   AAROM  Knee flexion 74 degrees  Knee extension -10 degrees    Treatments:  PT Therapeutic Procedures Time Entry  Manual Therapy Time Entry: 30  Therapeutic Exercise Time Entry: 20    Therapeutic Exercise (67301):  Nustep x5min L1  Supine " HS/Calf stretch x20s ea  Supine heel slide x15  Knee flex to tolerance x10  SAQ x15  Ankle pumps x20  Heel slides seated x15    Manual Therapy (53207):  Patella mobs proximal-distal, distal-proximal, lateral diagonals into stiffness  Tibiofemoral gr1 joint mobilization AP flexion and AP extension anterior drawer/lachman position  Functional stm to quadriceps  STM quad, calf  Tibiofemoral joint distraction    CP x20    Education and discussion on HEP and treatment regarding the benefits related to current condition, POC, pathophysiology, and precautions

## 2025-04-21 ENCOUNTER — TREATMENT (OUTPATIENT)
Dept: PHYSICAL THERAPY | Facility: CLINIC | Age: 65
End: 2025-04-21
Payer: COMMERCIAL

## 2025-04-21 DIAGNOSIS — Z96.651 STATUS POST TOTAL RIGHT KNEE REPLACEMENT: ICD-10-CM

## 2025-04-21 DIAGNOSIS — M17.11 PRIMARY OSTEOARTHRITIS OF RIGHT KNEE: ICD-10-CM

## 2025-04-21 PROCEDURE — 97110 THERAPEUTIC EXERCISES: CPT | Mod: GP

## 2025-04-21 PROCEDURE — 97140 MANUAL THERAPY 1/> REGIONS: CPT | Mod: GP

## 2025-04-23 ENCOUNTER — OFFICE VISIT (OUTPATIENT)
Dept: ORTHOPEDIC SURGERY | Facility: CLINIC | Age: 65
End: 2025-04-23
Payer: COMMERCIAL

## 2025-04-23 ENCOUNTER — ANCILLARY PROCEDURE (OUTPATIENT)
Dept: ORTHOPEDIC SURGERY | Facility: CLINIC | Age: 65
End: 2025-04-23
Payer: COMMERCIAL

## 2025-04-23 ENCOUNTER — TREATMENT (OUTPATIENT)
Dept: PHYSICAL THERAPY | Facility: CLINIC | Age: 65
End: 2025-04-23
Payer: COMMERCIAL

## 2025-04-23 DIAGNOSIS — M17.11 PRIMARY OSTEOARTHRITIS OF RIGHT KNEE: ICD-10-CM

## 2025-04-23 DIAGNOSIS — Z96.651 STATUS POST TOTAL RIGHT KNEE REPLACEMENT: ICD-10-CM

## 2025-04-23 PROCEDURE — 97140 MANUAL THERAPY 1/> REGIONS: CPT | Mod: GP

## 2025-04-23 PROCEDURE — 97535 SELF CARE MNGMENT TRAINING: CPT | Mod: GP

## 2025-04-23 PROCEDURE — 73560 X-RAY EXAM OF KNEE 1 OR 2: CPT | Mod: RIGHT SIDE | Performed by: ORTHOPAEDIC SURGERY

## 2025-04-23 PROCEDURE — 99024 POSTOP FOLLOW-UP VISIT: CPT | Performed by: ORTHOPAEDIC SURGERY

## 2025-04-23 RX ORDER — CEPHALEXIN 500 MG/1
500 CAPSULE ORAL 4 TIMES DAILY
Qty: 40 CAPSULE | Refills: 0 | Status: SHIPPED | OUTPATIENT
Start: 2025-04-23 | End: 2025-05-03

## 2025-04-23 RX ORDER — DOCUSATE SODIUM 100 MG/1
100 CAPSULE, LIQUID FILLED ORAL 2 TIMES DAILY
Qty: 14 CAPSULE | Refills: 0 | Status: SHIPPED | OUTPATIENT
Start: 2025-04-23 | End: 2025-04-30

## 2025-04-23 RX ORDER — CYCLOBENZAPRINE HCL 10 MG
10 TABLET ORAL 3 TIMES DAILY PRN
Qty: 21 TABLET | Refills: 0 | Status: SHIPPED | OUTPATIENT
Start: 2025-04-23 | End: 2025-04-30

## 2025-04-23 NOTE — PROGRESS NOTES
"Physical Therapy Treatment      Patient Name: Mark Mendez \"Reza\"  MRN: [id number]  Today's Date: 4/22/2025  Visit #5       Insurance:  2025 // 0% coins, $3200 ded ($973.96 met), $3200 oop ($973.96 met), no copay, 40v PT/OT jena yr (0v used as of 3/24/2025), no PA    [employer]  [address]60 - [address] not covered.    Assessment:  Reza is being treated s/p R TKA 4/8/2025. Deferred therapeutic activity and joint mobilization due to his current status with increased knee swelling, incision drainage, and hypersensitivity to touch. Pt will continue formal PT following ortho visit today.   Pt will continue to benefit from skilled PT interventions to address deficits and return to prior functional levels. Treatment includes: manual techniques to decrease pain and improve joint/soft-tissue mobility, as well as exercises to increase strength, endurance, and neuromotor control. Home exercise program has been updated to augment this session.    Plan:  Pending ortho assessment     Current Problem:   No diagnosis found.    Subjective   Reza states he has had an increase in pain and is going to see ortho today due to increased signs of infection. He is currently in 9/10 pain.     Objective   Knee girth at joint line:   **Compression stocking on L leg  46cm R  42.5cm L  Calf Joint Line:  36cm R  33cm L  Increased erythema and warmth globally  Mild drainage at incision line   Global knee tenderness    Treatments:       Manual Therapy (53710):  STM gostroc-soleus complex, distal quadriceps  Incision dressing application    Education and discussion on current stage of healing, signs of infection, DVT risk, and preacautions      "

## 2025-04-23 NOTE — PROGRESS NOTES
History of present illness patient is 2-week status post right total knee replacement.  He stopped physical therapy because he had some drainage.  He placed a wrap on it and came to our clinic.  Currently in our office we noted no current drainage.      Physical exam:      General: No acute distress or breathing difficulty or discomfort, pleasant and cooperative with the examination.    Extremities: Right knee patient does have 2+ effusion significant ecchymosis he can perform a straight leg raise but his current range of motion is 8 degrees to 70 degrees      Diagnostic studies: X-rays 2 views taken today of the right knee show aligned with position right total knee replacement they are read by Dr. Rajendra Waters    Impression: Status post right total knee replacement    Plan: Will place patient on antibiotic.  Refill of  muscle relaxer.  Follow-up in 1 week for wound check.

## 2025-04-24 ENCOUNTER — APPOINTMENT (OUTPATIENT)
Dept: ORTHOPEDIC SURGERY | Facility: CLINIC | Age: 65
End: 2025-04-24
Payer: COMMERCIAL

## 2025-04-28 ENCOUNTER — TREATMENT (OUTPATIENT)
Dept: PHYSICAL THERAPY | Facility: CLINIC | Age: 65
End: 2025-04-28
Payer: COMMERCIAL

## 2025-04-28 DIAGNOSIS — Z96.651 STATUS POST TOTAL RIGHT KNEE REPLACEMENT: ICD-10-CM

## 2025-04-28 DIAGNOSIS — M17.11 PRIMARY OSTEOARTHRITIS OF RIGHT KNEE: ICD-10-CM

## 2025-04-28 PROCEDURE — 97140 MANUAL THERAPY 1/> REGIONS: CPT | Mod: GP | Performed by: GENERAL ACUTE CARE HOSPITAL

## 2025-04-28 PROCEDURE — 97110 THERAPEUTIC EXERCISES: CPT | Mod: GP | Performed by: GENERAL ACUTE CARE HOSPITAL

## 2025-04-30 ENCOUNTER — TREATMENT (OUTPATIENT)
Dept: PHYSICAL THERAPY | Facility: CLINIC | Age: 65
End: 2025-04-30
Payer: COMMERCIAL

## 2025-04-30 DIAGNOSIS — M17.11 PRIMARY OSTEOARTHRITIS OF RIGHT KNEE: ICD-10-CM

## 2025-04-30 DIAGNOSIS — Z96.651 STATUS POST TOTAL RIGHT KNEE REPLACEMENT: ICD-10-CM

## 2025-04-30 PROCEDURE — 97110 THERAPEUTIC EXERCISES: CPT | Mod: GP | Performed by: GENERAL ACUTE CARE HOSPITAL

## 2025-04-30 PROCEDURE — 97140 MANUAL THERAPY 1/> REGIONS: CPT | Mod: GP | Performed by: GENERAL ACUTE CARE HOSPITAL

## 2025-04-30 NOTE — PROGRESS NOTES
"Patient Name: Mark Mendez \"Reza\"  MRN: [id number]  Today's Date: 4/30/2025  Time Calculation  Start Time: 0830  Stop Time: 0915  Time Calculation (min): 45 min  Current Problem:  1. Primary osteoarthritis of right knee  Follow Up In Physical Therapy      2. Status post total right knee replacement  Follow Up In Physical Therapy          Visit 7/20    Subjective:  Change in pain/ pain level: 6/10  Change in function: walking more, up to 5000 steps the other day   Patient comments: improved knee, less swelling heat and no drainage     Objective: knee ROM 80-14-0     Treatment:    Therapeutic exercise (10872):  nustep  Stretches      Manual Therapy (26705):  Patella mobs proximal-distal, distal-proximal, lateral diagonals into stiffness  Tibial Femoral A-P flexion and A-P extension multiple knee flexion angles  Stretching into flexion and extension  DTM quadriceps, ITBand/ Hamstring     Assessment:  Patient notes improved pain levels resultant from activities in therapy session. Improved tissue quality noted as well as body mechanics demonstrated after cueing and corrections. Patient continues to require active therapy to progress functional capabilities with decreasing pain levels and improving mechanics with functional activities including progression of Home exercise program. Tolerance to today's treatment was good. Muscle fatigue noted.  Patient appears motivated and compliant this date, demonstrating a working understanding of principals instructed and home program.    Plan:  Progress with functional strength as tolerated with respect to tissue healing. Manual therapy PRN to improve/maintain ROM, prevent adhesion, reduce pain.  "

## 2025-05-05 ENCOUNTER — TREATMENT (OUTPATIENT)
Dept: PHYSICAL THERAPY | Facility: CLINIC | Age: 65
End: 2025-05-05
Payer: COMMERCIAL

## 2025-05-05 DIAGNOSIS — M17.11 PRIMARY OSTEOARTHRITIS OF RIGHT KNEE: ICD-10-CM

## 2025-05-05 DIAGNOSIS — Z96.651 STATUS POST TOTAL RIGHT KNEE REPLACEMENT: ICD-10-CM

## 2025-05-05 PROCEDURE — 97110 THERAPEUTIC EXERCISES: CPT | Mod: GP | Performed by: GENERAL ACUTE CARE HOSPITAL

## 2025-05-05 PROCEDURE — 97140 MANUAL THERAPY 1/> REGIONS: CPT | Mod: GP | Performed by: GENERAL ACUTE CARE HOSPITAL

## 2025-05-05 NOTE — PROGRESS NOTES
"Patient Name: Mark Mendez \"Reza\"  MRN: [id number]  Today's Date: 5/5/2025  Time Calculation  Start Time: 1000  Stop Time: 1057  Time Calculation (min): 57 min  Current Problem:  1. Primary osteoarthritis of right knee  Follow Up In Physical Therapy      2. Status post total right knee replacement  Follow Up In Physical Therapy          Visit 8/20    Subjective:  Change in pain/ pain level: 4/10  Change in function: doing HEP as instructed, started TM  and focus on increased knee ROM with gait  Patient comments: doing well, walking has improved. Not using the walker or cane at home much. Working on steps a bit    Objective: knee ROM 93-7-0 AAROM     Treatment:    Therapeutic exercise (52182):  nustep 7 min  Stretches: HS GS, Kneee flex  Tiltboard L2 20/ 60 sec  Foam narrow VALERIA balance eyes open/ eyes closed   Leg press 75 # x20  HSC 30 #x20   Manual Therapy (60236):  Patella mobs proximal-distal, distal-proximal, lateral diagonals into stiffness  Tibial Femoral A-P flexion and A-P extension multiple knee flexion angles  Stretching into flexion and extension  DTM quadriceps, ITBand/ Hamstring     Assessment:  Progressed exercise with good tolerance.  Patient notes improved pain levels resultant from activities in therapy session. Improved tissue quality noted as well as body mechanics demonstrated after cueing and corrections. Patient continues to require active therapy to progress functional capabilities with decreasing pain levels and improving mechanics with functional activities including progression of Home exercise program. Tolerance to today's treatment was good. Muscle fatigue noted.  Patient appears motivated and compliant this date, demonstrating a working understanding of principals instructed and home program.    Plan:  Progress with functional strength as tolerated with respect to tissue healing. Manual therapy PRN to improve/maintain ROM, prevent adhesion, reduce pain.  "

## 2025-05-07 ENCOUNTER — TREATMENT (OUTPATIENT)
Dept: PHYSICAL THERAPY | Facility: CLINIC | Age: 65
End: 2025-05-07
Payer: COMMERCIAL

## 2025-05-07 DIAGNOSIS — M17.11 PRIMARY OSTEOARTHRITIS OF RIGHT KNEE: ICD-10-CM

## 2025-05-07 DIAGNOSIS — Z96.651 STATUS POST TOTAL RIGHT KNEE REPLACEMENT: ICD-10-CM

## 2025-05-07 PROCEDURE — 97140 MANUAL THERAPY 1/> REGIONS: CPT | Mod: GP | Performed by: GENERAL ACUTE CARE HOSPITAL

## 2025-05-07 PROCEDURE — 97110 THERAPEUTIC EXERCISES: CPT | Mod: GP | Performed by: GENERAL ACUTE CARE HOSPITAL

## 2025-05-07 NOTE — PROGRESS NOTES
"Patient Name: Mark Mendez \"Reza\"  MRN: [id number]  Today's Date: 5/7/2025  Time Calculation  Start Time: 0915  Stop Time: 1000  Time Calculation (min): 45 min  Current Problem:  1. Primary osteoarthritis of right knee  Follow Up In Physical Therapy      2. Status post total right knee replacement  Follow Up In Physical Therapy          Visit 9/20    Subjective:  Change in pain/ pain level: 6/10  Change in function: leg and body were really sore after last time  Patient comments: working on ROM and walking mostly     Objective: 96-11-0     Treatment:    Therapeutic exercise (16054):  nustep 7 min  Stretches: HS GS, Kneee flex  Tiltboard L2 20/ 60 sec  Foam narrow VALERIA balance eyes open/ eyes closed   Leg press 75 # x20  HSC 30 #x20   Manual Therapy (20932):  Patella mobs proximal-distal, distal-proximal, lateral diagonals into stiffness  Tibial Femoral A-P flexion and A-P extension multiple knee flexion angles  Stretching into flexion and extension  DTM quadriceps, ITBand/ Hamstring        Assessment: advised to heat posterior knee for ext ROM   Patient notes improved pain levels resultant from activities in therapy session. Improved tissue quality noted as well as body mechanics demonstrated after cueing and corrections. Patient continues to require active therapy to progress functional capabilities with decreasing pain levels and improving mechanics with functional activities including progression of Home exercise program. Tolerance to today's treatment was good. Muscle fatigue noted.  Patient appears motivated and compliant this date, demonstrating a working understanding of principals instructed and home program.    Plan:  Progress with functional strength as tolerated with respect to tissue healing. Manual therapy PRN to improve/maintain ROM, prevent adhesion, reduce pain.  "

## 2025-05-12 ENCOUNTER — TREATMENT (OUTPATIENT)
Dept: PHYSICAL THERAPY | Facility: CLINIC | Age: 65
End: 2025-05-12
Payer: COMMERCIAL

## 2025-05-12 DIAGNOSIS — Z96.651 STATUS POST TOTAL RIGHT KNEE REPLACEMENT: Primary | ICD-10-CM

## 2025-05-12 DIAGNOSIS — M17.11 PRIMARY OSTEOARTHRITIS OF RIGHT KNEE: ICD-10-CM

## 2025-05-12 DIAGNOSIS — Z96.651 STATUS POST TOTAL RIGHT KNEE REPLACEMENT: ICD-10-CM

## 2025-05-12 PROCEDURE — 97110 THERAPEUTIC EXERCISES: CPT | Mod: GP | Performed by: GENERAL ACUTE CARE HOSPITAL

## 2025-05-12 PROCEDURE — 97140 MANUAL THERAPY 1/> REGIONS: CPT | Mod: GP | Performed by: GENERAL ACUTE CARE HOSPITAL

## 2025-05-12 RX ORDER — OXYCODONE HYDROCHLORIDE 5 MG/1
5 TABLET ORAL EVERY 6 HOURS PRN
Qty: 28 TABLET | Refills: 0 | Status: SHIPPED | OUTPATIENT
Start: 2025-05-12 | End: 2025-05-19

## 2025-05-12 NOTE — PROGRESS NOTES
"Patient Name: Mark Mendez \"Reza\"  MRN: [id number]  Today's Date: 5/12/2025  Time Calculation  Start Time: 1000  Stop Time: 1055  Time Calculation (min): 55 min  Current Problem:  1. Primary osteoarthritis of right knee  Follow Up In Physical Therapy      2. Status post total right knee replacement  Follow Up In Physical Therapy          Visit 10/20    Subjective:  Change in pain/ pain level: 6/10  Change in function: working on straight and bend   Patient comments: out of narcotic meds, may need some more as can not sleep     Objective: knee -8-0     Treatment:    Therapeutic exercise (51946): nustep 7 min  Stretches: HS GS, Kneee flex  Tiltboard L2 20/ 60 sec  Tke 20 x20   Bike SH 7 5 min no resist c  Foam narrow VALERIA balance eyes open/ eyes closed   Leg press 75 # x20  HSC 30 #x20   Manual Therapy ([id number]):  Patella mobs proximal-distal, distal-proximal, lateral diagonals into stiffness  Tibial Femoral A-P flexion and A-P extension multiple knee flexion angles  Stretching into flexion and extension  DTM quadriceps, ITBand/ Hamstring     Assessment:  Able to go arounf on bike retro seat height 7  Patient notes improved pain levels resultant from activities in therapy session. Improved tissue quality noted as well as body mechanics demonstrated after cueing and corrections. Patient continues to require active therapy to progress functional capabilities with decreasing pain levels and improving mechanics with functional activities including progression of Home exercise program. Tolerance to today's treatment was good. Muscle fatigue noted.  Patient appears motivated and compliant this date, demonstrating a working understanding of principals instructed and home program.    Plan:  Progress with functional strength as tolerated with respect to tissue healing. Manual therapy PRN to improve/maintain ROM, prevent adhesion, reduce pain.  "

## 2025-05-14 ENCOUNTER — TREATMENT (OUTPATIENT)
Dept: PHYSICAL THERAPY | Facility: CLINIC | Age: 65
End: 2025-05-14
Payer: COMMERCIAL

## 2025-05-14 DIAGNOSIS — Z96.651 STATUS POST TOTAL RIGHT KNEE REPLACEMENT: ICD-10-CM

## 2025-05-14 DIAGNOSIS — M17.11 PRIMARY OSTEOARTHRITIS OF RIGHT KNEE: ICD-10-CM

## 2025-05-14 PROCEDURE — 97110 THERAPEUTIC EXERCISES: CPT | Mod: GP | Performed by: GENERAL ACUTE CARE HOSPITAL

## 2025-05-14 PROCEDURE — 97140 MANUAL THERAPY 1/> REGIONS: CPT | Mod: GP | Performed by: GENERAL ACUTE CARE HOSPITAL

## 2025-05-14 NOTE — PROGRESS NOTES
"Patient Name: Mark Mendez \"Reza\"  MRN: [id number]  Today's Date: 5/14/2025  Time Calculation  Start Time: 0915  Stop Time: 1000  Time Calculation (min): 45 min  Current Problem:  1. Primary osteoarthritis of right knee  Follow Up In Physical Therapy      2. Status post total right knee replacement  Follow Up In Physical Therapy          Visit 11/20    Subjective:  Change in pain/ pain level: 6/10  Change in function: did not work as hard yesterday- knee and muscle are sore, working on ext ROM with heat at home    Objective: 103-6-0     Treatment:    Therapeutic exercise (07363):  nustep   stretches   PROM   Manual Therapy (52613):  Patella mobs proximal-distal, distal-proximal, lateral diagonals into stiffness  Tibial Femoral A-P flexion and A-P extension multiple knee flexion angles  Stretching into flexion and extension  DTM quadriceps, ITBand/ Hamstring       Assessment:  Improving ROM  Patient notes improved pain levels resultant from activities in therapy session. Improved tissue quality noted as well as body mechanics demonstrated after cueing and corrections. Patient continues to require active therapy to progress functional capabilities with decreasing pain levels and improving mechanics with functional activities including progression of Home exercise program. Tolerance to today's treatment was good.   Patient appears motivated and compliant this date, demonstrating a working understanding of principals instructed and home program.    Plan:  Progress with functional strength as tolerated with respect to tissue healing. Manual therapy PRN to improve/maintain ROM, prevent adhesion, reduce pain.  "

## 2025-05-19 ENCOUNTER — TREATMENT (OUTPATIENT)
Dept: PHYSICAL THERAPY | Facility: CLINIC | Age: 65
End: 2025-05-19
Payer: COMMERCIAL

## 2025-05-19 DIAGNOSIS — M17.11 PRIMARY OSTEOARTHRITIS OF RIGHT KNEE: ICD-10-CM

## 2025-05-19 DIAGNOSIS — Z96.651 STATUS POST TOTAL RIGHT KNEE REPLACEMENT: ICD-10-CM

## 2025-05-19 PROCEDURE — 97110 THERAPEUTIC EXERCISES: CPT | Mod: GP | Performed by: GENERAL ACUTE CARE HOSPITAL

## 2025-05-19 PROCEDURE — 97140 MANUAL THERAPY 1/> REGIONS: CPT | Mod: GP | Performed by: GENERAL ACUTE CARE HOSPITAL

## 2025-05-19 NOTE — PROGRESS NOTES
"Patient Name: Mark Mendez \"Reza\"  MRN: [id number]  Today's Date: 5/19/2025  Time Calculation  Start Time: 1000  Stop Time: 1045  Time Calculation (min): 45 min  Current Problem:  1. Primary osteoarthritis of right knee  Follow Up In Physical Therapy      2. Status post total right knee replacement  Follow Up In Physical Therapy          Visit 12/20    Subjective:  Change in pain/ pain level: 4/10  Change in function: increased ambulation to 8k steps     Objective: right knee 107-6-0    Treatment:    Therapeutic exercise ([id number]):  Nustep 5 min level 5  Stretches: HS/ quad, hip flexor, gastroc   \heel raise off step x20   Tiltboard L2 x20, 60 sec   TKE 17.5 x30  Step up F 8 in x20, lat 6 in x20, Retro 6 in x20  Leg Press 100 # x30  HSC 45# x30  Manual Therapy ([id number]):  Patella mobs proximal-distal, distal-proximal, lateral diagonals into stiffness  Tibial Femoral A-P flexion and A-P extension multiple knee flexion angles  Stretching into flexion and extension  DTM quadriceps, ITBand/ Hamstring     Assessment:  Improving ROM. Patient notes improved pain levels resultant from activities in therapy session. Improved tissue quality noted as well as body mechanics demonstrated after cueing and corrections. Patient continues to require active therapy to progress functional capabilities with decreasing pain levels and improving mechanics with functional activities including progression of Home exercise program. Tolerance to today's treatment was good. Muscle fatigue noted.  Patient appears motivated and compliant this date, demonstrating a working understanding of principals instructed and home program.    Plan:  Progress with functional strength as tolerated with respect to tissue healing. Manual therapy PRN to improve/maintain ROM, prevent adhesion, reduce pain.  "

## 2025-05-21 ENCOUNTER — TREATMENT (OUTPATIENT)
Dept: PHYSICAL THERAPY | Facility: CLINIC | Age: 65
End: 2025-05-21
Payer: COMMERCIAL

## 2025-05-21 DIAGNOSIS — M17.11 PRIMARY OSTEOARTHRITIS OF RIGHT KNEE: ICD-10-CM

## 2025-05-21 DIAGNOSIS — Z96.651 STATUS POST TOTAL RIGHT KNEE REPLACEMENT: ICD-10-CM

## 2025-05-21 PROCEDURE — 97140 MANUAL THERAPY 1/> REGIONS: CPT | Mod: GP | Performed by: GENERAL ACUTE CARE HOSPITAL

## 2025-05-21 PROCEDURE — 97110 THERAPEUTIC EXERCISES: CPT | Mod: GP | Performed by: GENERAL ACUTE CARE HOSPITAL

## 2025-05-21 NOTE — PROGRESS NOTES
"Patient Name: Mark Mendez \"Reza\"  MRN: [id number]  Today's Date: 5/21/2025  Time Calculation  Start Time: 0911  Stop Time: 1004  Time Calculation (min): 53 min  Current Problem:  1. Primary osteoarthritis of right knee  Follow Up In Physical Therapy      2. Status post total right knee replacement  Follow Up In Physical Therapy          Visit 13/20    Subjective:  Change in pain/ pain level: 5-6/10  Change in function: walking 14k steps   Patient comments: did not sleep last night    Objective: 106-5-0    Treatment:    Therapeutic exercise (27300):   Nustep 5 min level 5  Stretches: HS/ quad, hip flexor, gastroc   Tiltboard L2 x20, 60 sec   TKE 17.5 x30  Step up F 8 in x20, lat 6 in x20, Retro 6 in x20  Leg Press 100 # x30  HSC 45# x30  Manual Therapy (77597):  Patella mobs proximal-distal, distal-proximal, lateral diagonals into stiffness  Tibial Femoral A-P flexion and A-P extension multiple knee flexion angles, prone tib PA  Stretching into flexion and extension  DTM quadriceps, ITBand/ Hamstring     Assessment:  Patient notes improved pain levels resultant from activities in therapy session. Improved tissue quality noted as well as body mechanics demonstrated after cueing and corrections. Patient continues to require active therapy to progress functional capabilities with decreasing pain levels and improving mechanics with functional activities including progression of Home exercise program. Tolerance to today's treatment was good. Muscle fatigue noted.  Patient appears motivated and compliant this date, demonstrating a working understanding of principals instructed and home program.    Plan:  Progress with functional strength as tolerated with respect to tissue healing. Manual therapy PRN to improve/maintain ROM, prevent adhesion, reduce pain.  "

## 2025-05-22 ENCOUNTER — OFFICE VISIT (OUTPATIENT)
Dept: ORTHOPEDIC SURGERY | Facility: CLINIC | Age: 65
End: 2025-05-22
Payer: COMMERCIAL

## 2025-05-22 DIAGNOSIS — Z96.651 STATUS POST TOTAL RIGHT KNEE REPLACEMENT: Primary | ICD-10-CM

## 2025-05-22 PROCEDURE — 99212 OFFICE O/P EST SF 10 MIN: CPT | Performed by: ORTHOPAEDIC SURGERY

## 2025-05-22 NOTE — PROGRESS NOTES
History of present illness patient is almost 6 weeks status post right total knee replacement.  He has made significant progress in terms of range of motion and physical therapy.  Still has night soreness and pain.  Difficulty getting rest.      Physical exam:      General: No acute distress or breathing difficulty or discomfort, pleasant and cooperative with the examination.    Extremities: Right knee incisions clean dry and intact current range of motion is 4 degrees to 106 degrees      Impression: Status post right total knee replacement    Plan: Patient's going to continue to work on range of motion with physical therapy.  He will follow-up in 1 month at that time we will get x-rays 2 views of the right knee and do another range of motion check.  Due to his progress in terms of range of motion we do not believe he will need a manipulation.

## 2025-05-23 DIAGNOSIS — Z00.00 WELLNESS EXAMINATION: Primary | ICD-10-CM

## 2025-05-23 DIAGNOSIS — M17.11 PRIMARY OSTEOARTHRITIS OF RIGHT KNEE: ICD-10-CM

## 2025-05-23 DIAGNOSIS — E78.2 MIXED HYPERLIPIDEMIA: ICD-10-CM

## 2025-05-23 DIAGNOSIS — I25.10 CORONARY ARTERY DISEASE INVOLVING NATIVE HEART, UNSPECIFIED VESSEL OR LESION TYPE, UNSPECIFIED WHETHER ANGINA PRESENT: ICD-10-CM

## 2025-05-28 ENCOUNTER — TREATMENT (OUTPATIENT)
Dept: PHYSICAL THERAPY | Facility: CLINIC | Age: 65
End: 2025-05-28
Payer: COMMERCIAL

## 2025-05-28 DIAGNOSIS — M17.11 PRIMARY OSTEOARTHRITIS OF RIGHT KNEE: ICD-10-CM

## 2025-05-28 DIAGNOSIS — Z96.651 STATUS POST TOTAL RIGHT KNEE REPLACEMENT: ICD-10-CM

## 2025-05-28 PROCEDURE — 97140 MANUAL THERAPY 1/> REGIONS: CPT | Mod: GP | Performed by: GENERAL ACUTE CARE HOSPITAL

## 2025-05-28 PROCEDURE — 97110 THERAPEUTIC EXERCISES: CPT | Mod: GP | Performed by: GENERAL ACUTE CARE HOSPITAL

## 2025-05-28 NOTE — PROGRESS NOTES
"Patient Name: Mark Mendez \"Reza\"  MRN: [id number]  Today's Date: 5/28/2025  Time Calculation  Start Time: 0915  Stop Time: 1000  Time Calculation (min): 45 min  Current Problem:  1. Primary osteoarthritis of right knee  Follow Up In Physical Therapy      2. Status post total right knee replacement  Follow Up In Physical Therapy          Visit 14/20    Subjective:  Change in pain/ pain level: 2/10  Change in function: not sleeping well  Patient comments: doing a lot more, calf still hurts     Objective:  107-2-0 PROM    Treatment:    Therapeutic exercise (69004):    Bike 5 min  Self mob ankle   Stretches: HS/ quad, hip flexor, gastroc   Tiltboard L2 x20, 60 sec   TKE 17.5 x30  Step up F 8 in x20, lat 6 in x20, Retro 6 in x20  Leg Press 100 # x30  HSC 45# x30  Manual Therapy (27755):  Patella mobs proximal-distal, distal-proximal, lateral diagonals into stiffness  Tibial Femoral A-P flexion and A-P extension multiple knee flexion angles, prone tib PA  Stretching into flexion and extension  DTM quadriceps, ITBand/ Hamstring     Assessment:  Improved ankle mobility after mobilization. Patient notes improved pain levels resultant from activities in therapy session. Improved tissue quality noted as well as body mechanics demonstrated after cueing and corrections. Patient continues to require active therapy to progress functional capabilities with decreasing pain levels and improving mechanics with functional activities including progression of Home exercise program. Tolerance to today's treatment was good. Muscle fatigue noted.  Patient appears motivated and compliant this date, demonstrating a working understanding of principals instructed and home program.    Plan:  Progress with functional strength as tolerated with respect to tissue healing. Manual therapy PRN to improve/maintain ROM, prevent adhesion, reduce pain.  "

## 2025-06-04 ENCOUNTER — APPOINTMENT (OUTPATIENT)
Dept: CARDIOLOGY | Facility: CLINIC | Age: 65
End: 2025-06-04
Payer: COMMERCIAL

## 2025-06-04 ENCOUNTER — TREATMENT (OUTPATIENT)
Dept: PHYSICAL THERAPY | Facility: CLINIC | Age: 65
End: 2025-06-04
Payer: COMMERCIAL

## 2025-06-04 DIAGNOSIS — Z96.651 STATUS POST TOTAL RIGHT KNEE REPLACEMENT: ICD-10-CM

## 2025-06-04 DIAGNOSIS — M17.11 PRIMARY OSTEOARTHRITIS OF RIGHT KNEE: ICD-10-CM

## 2025-06-04 PROCEDURE — 97110 THERAPEUTIC EXERCISES: CPT | Mod: GP | Performed by: GENERAL ACUTE CARE HOSPITAL

## 2025-06-04 PROCEDURE — 97140 MANUAL THERAPY 1/> REGIONS: CPT | Mod: GP | Performed by: GENERAL ACUTE CARE HOSPITAL

## 2025-06-04 NOTE — PROGRESS NOTES
"Patient Name: Mark Mendez \"Reza\"  MRN: [id number]  Today's Date: 6/4/2025  Time Calculation  Start Time: 0915  Stop Time: 1004  Time Calculation (min): 49 min  Current Problem:  1. Primary osteoarthritis of right knee  Follow Up In Physical Therapy      2. Status post total right knee replacement  Follow Up In Physical Therapy          Visit 15/20    Subjective:  Change in pain/ pain level: 3/10  Patient comments: not sleeping well    Objective: 110-3-0    Treatment:    Therapeutic exercise (65492):    Nustep   Bike  stretches  Manual Therapy (91866):  Patella mobs proximal-distal, distal-proximal, lateral diagonals into stiffness  Tibial Femoral A-P flexion and A-P extension multiple knee flexion angles  Stretching into flexion and extension  DTM quadriceps, ITBand/ Hamstring     Assessment:  Patient notes improved pain levels resultant from activities in therapy session. Improved tissue quality noted as well as body mechanics demonstrated after cueing and corrections. Patient continues to require active therapy to progress functional capabilities with decreasing pain levels and improving mechanics with functional activities including progression of Home exercise program. Tolerance to today's treatment was good. Muscle fatigue noted.  Patient appears motivated and compliant this date, demonstrating a working understanding of principals instructed and home program.    Plan:  Progress with functional strength as tolerated with respect to tissue healing. Manual therapy PRN to improve/maintain ROM, prevent adhesion, reduce pain.  " details… No joint pain, swelling or deformity; no limitation of movement detailed exam left knee/no joint swelling/no joint erythema/no joint warmth/no calf tenderness/decreased ROM due to pain

## 2025-06-06 LAB
25(OH)D3+25(OH)D2 SERPL-MCNC: 42 NG/ML (ref 30–100)
ALBUMIN SERPL-MCNC: 4.6 G/DL (ref 3.6–5.1)
ALP SERPL-CCNC: 60 U/L (ref 35–144)
ALT SERPL-CCNC: 33 U/L (ref 9–46)
ANA SER QL IF: NEGATIVE
ANION GAP SERPL CALCULATED.4IONS-SCNC: 9 MMOL/L (CALC) (ref 7–17)
AST SERPL-CCNC: 31 U/L (ref 10–35)
BILIRUB SERPL-MCNC: 0.5 MG/DL (ref 0.2–1.2)
BUN SERPL-MCNC: 26 MG/DL (ref 7–25)
CALCIUM SERPL-MCNC: 10.4 MG/DL (ref 8.6–10.3)
CHLORIDE SERPL-SCNC: 103 MMOL/L (ref 98–110)
CHOLEST SERPL-MCNC: 172 MG/DL
CHOLEST/HDLC SERPL: 2.2 (CALC)
CO2 SERPL-SCNC: 29 MMOL/L (ref 20–32)
CREAT SERPL-MCNC: 0.86 MG/DL (ref 0.7–1.35)
CRP SERPL-MCNC: <3 MG/L
EGFRCR SERPLBLD CKD-EPI 2021: 97 ML/MIN/1.73M2
ENA SS-A AB SER IA-ACNC: NORMAL AI
ENA SS-B AB SER IA-ACNC: NORMAL AI
GLUCOSE SERPL-MCNC: 95 MG/DL (ref 65–99)
HDLC SERPL-MCNC: 78 MG/DL
LDLC SERPL CALC-MCNC: 78 MG/DL (CALC)
NONHDLC SERPL-MCNC: 94 MG/DL (CALC)
POTASSIUM SERPL-SCNC: 4.6 MMOL/L (ref 3.5–5.3)
PROT SERPL-MCNC: 7.2 G/DL (ref 6.1–8.1)
PSA FREE MFR SERPL: 23 % (CALC)
PSA FREE SERPL-MCNC: 0.3 NG/ML
PSA SERPL-MCNC: 1.3 NG/ML
RHEUMATOID FACT SERPL-ACNC: <10 IU/ML
SODIUM SERPL-SCNC: 141 MMOL/L (ref 135–146)
TRIGL SERPL-MCNC: 78 MG/DL
TSH SERPL-ACNC: 1.73 MIU/L (ref 0.4–4.5)
URATE SERPL-MCNC: 5 MG/DL (ref 4–8)

## 2025-06-09 DIAGNOSIS — E83.52 SERUM CALCIUM ELEVATED: Primary | ICD-10-CM

## 2025-06-17 ENCOUNTER — TREATMENT (OUTPATIENT)
Dept: PHYSICAL THERAPY | Facility: CLINIC | Age: 65
End: 2025-06-17
Payer: COMMERCIAL

## 2025-06-17 DIAGNOSIS — Z96.651 STATUS POST TOTAL RIGHT KNEE REPLACEMENT: ICD-10-CM

## 2025-06-17 DIAGNOSIS — M17.11 PRIMARY OSTEOARTHRITIS OF RIGHT KNEE: ICD-10-CM

## 2025-06-17 PROCEDURE — 97140 MANUAL THERAPY 1/> REGIONS: CPT | Mod: GP | Performed by: GENERAL ACUTE CARE HOSPITAL

## 2025-06-17 PROCEDURE — 97110 THERAPEUTIC EXERCISES: CPT | Mod: GP | Performed by: GENERAL ACUTE CARE HOSPITAL

## 2025-06-17 NOTE — PROGRESS NOTES
"Patient Name: Mark Mendez \"Reza\"  MRN: [id number]  Today's Date: 6/17/2025     Current Problem:  1. Primary osteoarthritis of right knee  Follow Up In Physical Therapy      2. Status post total right knee replacement  Follow Up In Physical Therapy          Visit 16/20    Subjective:  Change in pain/ pain level: 0/10, took pain med before coming in  Change in function: treadmill, elliptical and bike at home in the morning. Numbness and tingling in bilateral hands in the morning when he wakes up.   Patient comments: Patient mentioned increased swelling in his right knee over the weekend. He stated he did not do anything differently, and that elevating the knee helped reduce the swelling. Message sent to nurse. Patient mentioned bilateral numbness and tingling in both hands when he wakes up in the morning and stated he has been sleeping on his side more. Patient also stated he has not tried to ride his bike outside yet. Also mentioned his hip cracks when he is on the elliptical but is not painful.     Objective: Patient not achieving terminal knee extension with walking.   Knee Extension ROM: 2-0 post mobilization     Treatment:    Therapeutic exercise (26820):    Stationary bike for improved knee ROM  Stretches on stairs for improved knee extension and knee flexion ROM  Manual Therapy (11756):    Patella mobs proximal-distal, distal-proximal, lateral diagonals into stiffness  Tibial Femoral A-P flexion and A-P extension   Stretching into flexion and extension  DTM Hamstring gastroc and soleus   TP release gastroc and soleus       Assessment:  Patient presented with slight knee flexion when ambulating and tightness was noted in his calf and distal hamstring with soft tissue mobilization. Improvement still needed with superior patellar glide to assist with knee extension. Improved tissue quality noted as well as body mechanics demonstrated after cueing and corrections. Patient continues to require active therapy to " progress functional capabilities with decreasing pain levels and improving mechanics with functional activities including progression of Home exercise program. Tolerance to today's treatment was good. Patient appears motivated and compliant this date, demonstrating a working understanding of principals instructed and will continue with home program.    Plan:  Progress with functional strength as tolerated with respect to tissue healing. Manual therapy PRN to improve/maintain ROM, prevent adhesion, reduce pain.

## 2025-06-18 LAB
ALBUMIN SERPL-MCNC: 4.2 G/DL (ref 3.6–5.1)
ALP SERPL-CCNC: 53 U/L (ref 35–144)
ALT SERPL-CCNC: 23 U/L (ref 9–46)
ANION GAP SERPL CALCULATED.4IONS-SCNC: 7 MMOL/L (CALC) (ref 7–17)
AST SERPL-CCNC: 24 U/L (ref 10–35)
BILIRUB SERPL-MCNC: 0.4 MG/DL (ref 0.2–1.2)
BUN SERPL-MCNC: 16 MG/DL (ref 7–25)
CALCIUM SERPL-MCNC: 9.3 MG/DL (ref 8.6–10.3)
CHLORIDE SERPL-SCNC: 106 MMOL/L (ref 98–110)
CO2 SERPL-SCNC: 26 MMOL/L (ref 20–32)
CREAT SERPL-MCNC: 0.77 MG/DL (ref 0.7–1.35)
EGFRCR SERPLBLD CKD-EPI 2021: 101 ML/MIN/1.73M2
GLUCOSE SERPL-MCNC: 106 MG/DL (ref 65–99)
POTASSIUM SERPL-SCNC: 4.1 MMOL/L (ref 3.5–5.3)
PROT SERPL-MCNC: 6.4 G/DL (ref 6.1–8.1)
PTH RELATED PROT SERPL-MCNC: 13 PG/ML (ref 11–20)
PTH-INTACT SERPL-MCNC: 47 PG/ML (ref 16–77)
SODIUM SERPL-SCNC: 139 MMOL/L (ref 135–146)

## 2025-06-19 ENCOUNTER — TREATMENT (OUTPATIENT)
Dept: PHYSICAL THERAPY | Facility: CLINIC | Age: 65
End: 2025-06-19
Payer: COMMERCIAL

## 2025-06-19 DIAGNOSIS — Z96.651 STATUS POST TOTAL RIGHT KNEE REPLACEMENT: ICD-10-CM

## 2025-06-19 DIAGNOSIS — M17.11 PRIMARY OSTEOARTHRITIS OF RIGHT KNEE: ICD-10-CM

## 2025-06-19 PROCEDURE — 97140 MANUAL THERAPY 1/> REGIONS: CPT | Mod: GP | Performed by: GENERAL ACUTE CARE HOSPITAL

## 2025-06-19 PROCEDURE — 97110 THERAPEUTIC EXERCISES: CPT | Mod: GP | Performed by: GENERAL ACUTE CARE HOSPITAL

## 2025-06-19 NOTE — PROGRESS NOTES
"Patient Name: Mark Mendez \"Reza\"  MRN: [id number]  Today's Date: 6/19/2025  Current Problem:  Primary Osteoarthritis of right knee - Follow Upper in Physical Therapy  Status post total right knee replacement - Follow Upper in Physical Therapy    Visit 17/20    Subjective:   Change in pain / pain level: 0/10, took pain medication this morning prior to treatment.   Change in function: Noticed \"twinges\" when going down the stairs reciprocally which he only started doing a few days ago.  Pain comments: States he had tingly hands again this morning.     Objective: Indents from high socks in his calf due to swelling. Mentioned increased swelling in his right leg.    Knee flexion PROM: 112  Knee extension PROM: 0    Therapeutic exercise: (04941)  Leg press #120 DL, SL #60  Stationary bike for improved knee ROM   Stretches on stairs for improved knee ROM     Manual Therapy: (19355)  Tibial Femoral A-P flexion and A-P extension     Patient education: Educated patient on swelling reduction techniques such as elevation and using compression sleeves. Prompted patient to get thigh high sleeves. Patient asked if he could instead just wear low socks so he does not cause an indent which we advised could be an alternative secondary to the compression stockings.      Assessment: Patient was inquiring about how to reduce his right leg swelling and seemed disinterested in attempting the thigh high stockings. Tightness still noted in posterior knee. Patient continues to require active therapy to progress functional capabilities with decreasing pain levels and improving mechanics with functional activities including progression of home exercise program. Tolerance to today's treatment was good. Patient appears motivated and compliant this date, demonstrating a working understanding of principals instructed and will continue with home program. Patient requested ice post treatment today.     Plan: Progress with functional strength " (eccentric quad strengthening to improve descending stairs) as tolerated with respect to tissue healing. Manual therapy PRN to improve/maintain ROM of knee flexion and extension, prevent adhesion, reduce pain.

## 2025-06-23 ENCOUNTER — APPOINTMENT (OUTPATIENT)
Dept: PHYSICAL THERAPY | Facility: CLINIC | Age: 65
End: 2025-06-23
Payer: COMMERCIAL

## 2025-06-26 ENCOUNTER — TREATMENT (OUTPATIENT)
Dept: PHYSICAL THERAPY | Facility: CLINIC | Age: 65
End: 2025-06-26
Payer: COMMERCIAL

## 2025-06-26 ENCOUNTER — HOSPITAL ENCOUNTER (OUTPATIENT)
Dept: RADIOLOGY | Facility: CLINIC | Age: 65
Discharge: HOME | End: 2025-06-26
Payer: COMMERCIAL

## 2025-06-26 ENCOUNTER — OFFICE VISIT (OUTPATIENT)
Dept: ORTHOPEDIC SURGERY | Facility: CLINIC | Age: 65
End: 2025-06-26
Payer: COMMERCIAL

## 2025-06-26 DIAGNOSIS — Z96.651 STATUS POST TOTAL RIGHT KNEE REPLACEMENT: ICD-10-CM

## 2025-06-26 DIAGNOSIS — M17.11 PRIMARY OSTEOARTHRITIS OF RIGHT KNEE: ICD-10-CM

## 2025-06-26 PROCEDURE — 73560 X-RAY EXAM OF KNEE 1 OR 2: CPT | Mod: RT

## 2025-06-26 PROCEDURE — 99212 OFFICE O/P EST SF 10 MIN: CPT | Performed by: ORTHOPAEDIC SURGERY

## 2025-06-26 PROCEDURE — 97140 MANUAL THERAPY 1/> REGIONS: CPT | Mod: GP

## 2025-06-26 PROCEDURE — 97110 THERAPEUTIC EXERCISES: CPT | Mod: GP

## 2025-06-26 NOTE — PROGRESS NOTES
"Patient Name: Mark \"Reza\" Vanessa  Today's Date: 6/26/2025  Visit #: 18/20  Time In: 0744  Time Out: 0838  Total time: 54    Subjective:   Pain level / change in pain: 3/10 at rest, 7/10 at worse, did not take medicine before treatment today.  Change in function: Stairs have improved Able to kneel on knee at [employer] for 2 min.   Patient comments: New pair of [employer] slippers caused lateral knee pain. Swelling still present. Knee feels \"stuffed\" likely because of scar tissue and swelling. Increased clicking with movement but denies pain. Started wearing thigh high compression socks.     Objective:   Knee flexion 117  Knee extension 5 AROM, 0 with PROM     Manual Therapy 79945:   Patellar mobs: slightly limited superior / inferior   Tibial Femoral A-P flexion and A-P extension   Fibular head mobilization PA flexion hypomobile    Therapeutic Exercise 37958:   SL knee extension RLE #10 x20  SL knee flexion RLE 20# x20  DL squat machine #60 x30  TKE #17.5 RLE x30  Tilt board: Fwd/bkwd, side/side  Repeted knee flexion on chair     Patient education: increases movement increases knee flexion ROM, repetitions for knee flexion, long holds for knee extension. Educated about continuing to ice his knee despite feeling like he needs to.     Assessment: Patient presented with decreased pain during today's treatment session. Patient displayed understanding and compliance with home exercise program and is doing a lot of AROM outside of physical therapy. Reza reported that going up/down stairs has gotten better and he does not feel the \"twinges\". Patient is still working on extension and can not yet get 0 deg actively. Tightness still noted in posterior knee. Added SL knee extension, SL knee flexion, DL squats, and cable TKEs. Patient tolerated today's treatment session well and will continue to benefit from skilled physical therapy services to address functional limitations.     Plan: Continue implementing manual therapy " techniques and therapeutic exercise to address functional ROM and strength limitations and progress/regress exercises as needed. Further treatments may include therapeutic exercise, therapeutic activities, manual therapy, neuromuscular re-education, dry needling. Next tx: step ups, chaim ponce    Student supervised 100% of the time by CI and all pertinent clinical decisions were made by CI (Elia Britton)

## 2025-06-26 NOTE — PROGRESS NOTES
History of present illness patient is almost 3 months status post right total knee replacement.  He has made significant progress over the last 3 months.  He does still have nighttime soreness but is able to do all his normal activities throughout the day.      Physical exam:      General: No acute distress or breathing difficulty or discomfort, pleasant and cooperative with the examination.    Extremities: Right knee incisions clean dry and intact trace effusion current range of motion 2 degrees to 115 degrees      Diagnostic studies: X-ray 2 views right knee show well and well-positioned right total knee replacement the read by Dr. Rajendra Waters    Impression: Status post right total knee replacement    Plan: Patient will continue low impact activities continue stretches for at least 3 more months.  He will follow-up in 1 year with x-rays 2 views of right knee and range of motion check sooner if he has any issues.

## 2025-07-17 DIAGNOSIS — E78.2 MIXED HYPERLIPIDEMIA: ICD-10-CM

## 2025-07-17 RX ORDER — ATORVASTATIN CALCIUM 40 MG/1
40 TABLET, FILM COATED ORAL DAILY
Qty: 90 TABLET | Refills: 3 | Status: SHIPPED | OUTPATIENT
Start: 2025-07-17 | End: 2026-07-17

## 2025-07-17 NOTE — TELEPHONE ENCOUNTER
Received request for prescription refills for patient.   Patient follows with Dr. Doty    Request is for atorvastatin  Is patient currently on medication yes    Last OV 3/12/2025  Next OV 3/11/2026    Pended for signing and sent to provider

## 2025-07-28 ENCOUNTER — APPOINTMENT (OUTPATIENT)
Dept: PHYSICAL THERAPY | Facility: CLINIC | Age: 65
End: 2025-07-28
Payer: COMMERCIAL

## 2025-07-31 ENCOUNTER — HOSPITAL ENCOUNTER (OUTPATIENT)
Dept: RADIOLOGY | Facility: CLINIC | Age: 65
Discharge: HOME | End: 2025-07-31
Payer: COMMERCIAL

## 2025-07-31 ENCOUNTER — OFFICE VISIT (OUTPATIENT)
Dept: ORTHOPEDIC SURGERY | Facility: CLINIC | Age: 65
End: 2025-07-31
Payer: COMMERCIAL

## 2025-07-31 DIAGNOSIS — M25.511 RIGHT SHOULDER PAIN, UNSPECIFIED CHRONICITY: ICD-10-CM

## 2025-07-31 DIAGNOSIS — M54.12 CERVICAL RADICULOPATHY: ICD-10-CM

## 2025-07-31 DIAGNOSIS — M75.101 TEAR OF RIGHT ROTATOR CUFF, UNSPECIFIED TEAR EXTENT, UNSPECIFIED WHETHER TRAUMATIC: ICD-10-CM

## 2025-07-31 DIAGNOSIS — M75.81 TENDINITIS OF RIGHT ROTATOR CUFF: ICD-10-CM

## 2025-07-31 PROCEDURE — 99212 OFFICE O/P EST SF 10 MIN: CPT | Mod: 25 | Performed by: ORTHOPAEDIC SURGERY

## 2025-07-31 PROCEDURE — 73030 X-RAY EXAM OF SHOULDER: CPT | Mod: RT

## 2025-07-31 PROCEDURE — 20610 DRAIN/INJ JOINT/BURSA W/O US: CPT | Mod: RT | Performed by: ORTHOPAEDIC SURGERY

## 2025-07-31 PROCEDURE — 2500000004 HC RX 250 GENERAL PHARMACY W/ HCPCS (ALT 636 FOR OP/ED): Performed by: ORTHOPAEDIC SURGERY

## 2025-07-31 RX ORDER — CELECOXIB 200 MG/1
1 CAPSULE ORAL
COMMUNITY
Start: 2025-05-23

## 2025-07-31 RX ORDER — LIDOCAINE HYDROCHLORIDE 10 MG/ML
5 INJECTION, SOLUTION INFILTRATION; PERINEURAL
Status: COMPLETED | OUTPATIENT
Start: 2025-07-31 | End: 2025-07-31

## 2025-07-31 RX ORDER — BETAMETHASONE SODIUM PHOSPHATE AND BETAMETHASONE ACETATE 3; 3 MG/ML; MG/ML
2 INJECTION, SUSPENSION INTRA-ARTICULAR; INTRALESIONAL; INTRAMUSCULAR; SOFT TISSUE
Status: COMPLETED | OUTPATIENT
Start: 2025-07-31 | End: 2025-07-31

## 2025-07-31 RX ADMIN — BETAMETHASONE SODIUM PHOSPHATE AND BETAMETHASONE ACETATE 2 ML: 3; 3 INJECTION, SUSPENSION INTRA-ARTICULAR; INTRALESIONAL; INTRAMUSCULAR at 10:10

## 2025-07-31 RX ADMIN — LIDOCAINE HYDROCHLORIDE 5 ML: 10 INJECTION, SOLUTION INFILTRATION; PERINEURAL at 10:10

## 2025-07-31 ASSESSMENT — PATIENT HEALTH QUESTIONNAIRE - PHQ9
2. FEELING DOWN, DEPRESSED OR HOPELESS: NOT AT ALL
SUM OF ALL RESPONSES TO PHQ9 QUESTIONS 1 AND 2: 0
1. LITTLE INTEREST OR PLEASURE IN DOING THINGS: NOT AT ALL

## 2025-07-31 NOTE — PROGRESS NOTES
History of present illness: History left rotator cuff surgery now right side acting up    He aggravated it probably doing a lot of crutch walking working out and lifting he has had profound numbness now for 6 months which is new in onset and concerning him    He had some muscle wasting around the shoulder girdle as well        Physical exam:    General: No acute distress or breathing difficulty or discomfort, pleasant and cooperative with the examination.    Extremities: The right shoulder was inspected and was found to have no obvious deformity.  There was tenderness to touch over the lateral edges of the shoulder over the rotator cuff insertion.  Active forward flexion 120 degrees, external rotation to 60 degrees, abduction to 50 degrees, and internal rotation to the level of L2.    At this time the shoulder is neurovascular tact, but had numbness and paresthesias down the arm into the forearm wrist and hand to the radial side digits sometimes is severe at night and last for 3040 minutes and then it dissipates  .  Motor intact C5-T1.  There was no obvious neck pain or radiculopathy noted.  There was no gross instability or adhesive capsulitis symptoms.  There was no evidence of apprehension or apprehension suppression.    Strength was tested in 4 planes with weakness in the supraspinatus strength testing and external rotation position.  There was no strength deficit in internal rotation.  Impingement signs were positive both supine and standing for impingement test type I and II.  There was mild pain over the bicipital groove with a positive speeds sign    Before aspiration injection the risks of a cortisone injection including infection, local skin irritation, skin atrophy, calcification, continued pain and discomfort, elevated blood sugar, burning, failure to relieve pain, possible late infection were discussed with the patient.    Postprocedure discomfort can be alleviated with additional medications, ice,  elevation, rest over the first 24 hours as recommended.    Patient verbalized understanding and wanted to proceed with the planned procedure.    After informed consent was provided and allergies verified, the patient was positioned appropriately on thel bed.  The right shoulder to be aspirated and injected was prepped and draped in a sterile fashion.  The skin was anesthetized with ethyl chloride spray.  A joint aspiration was to be performed an 18-gauge needle was used otherwise a 22-gauge needle was used to inject the appropriate joint.    Joint injection was performed with a mixture of 5 cc 1% lidocaine plain and 2 cc Celestone Soluspan 6 mg per mL.  The needle was removed and the puncture site closed and sealed with a Band-Aid.  The patient tolerated the procedure well.    Diagnostic studies: Unremarkable 2 view x-rays right    Impression: Right shoulder probable rotator cuff tear now with secondary nerve entrapment    Plan: Cortisone shot injection right shoulder    Therapy rehab program right shoulder    MRI right shoulder due to the muscle atrophy around the shoulder girdle    EMG to evaluate the persistent numbness in the radial side digits    L Inj/Asp: R subacromial bursa on 7/31/2025 10:10 AM  Indications: pain  Details: 22 G needle, medial approach  Medications: 2 mL betamethasone acet,sod phos 6 mg/mL; 5 mL lidocaine 10 mg/mL (1 %)  Outcome: tolerated well, no immediate complications  Procedure, treatment alternatives, risks and benefits explained, specific risks discussed. Consent was given by the patient. Immediately prior to procedure a time out was called to verify the correct patient, procedure, equipment, support staff and site/side marked as required.

## 2025-08-01 ENCOUNTER — TREATMENT (OUTPATIENT)
Dept: PHYSICAL THERAPY | Facility: CLINIC | Age: 65
End: 2025-08-01
Payer: COMMERCIAL

## 2025-08-01 DIAGNOSIS — M17.11 PRIMARY OSTEOARTHRITIS OF RIGHT KNEE: ICD-10-CM

## 2025-08-01 DIAGNOSIS — Z96.651 STATUS POST TOTAL RIGHT KNEE REPLACEMENT: ICD-10-CM

## 2025-08-01 PROCEDURE — 97140 MANUAL THERAPY 1/> REGIONS: CPT | Mod: GP

## 2025-08-01 PROCEDURE — 97110 THERAPEUTIC EXERCISES: CPT | Mod: GP

## 2025-08-05 ENCOUNTER — TREATMENT (OUTPATIENT)
Dept: PHYSICAL THERAPY | Facility: CLINIC | Age: 65
End: 2025-08-05
Payer: COMMERCIAL

## 2025-08-05 DIAGNOSIS — M25.511 RIGHT SHOULDER PAIN, UNSPECIFIED CHRONICITY: Primary | ICD-10-CM

## 2025-08-05 PROCEDURE — 97110 THERAPEUTIC EXERCISES: CPT | Mod: GP

## 2025-08-05 NOTE — PROGRESS NOTES
"Patient Name: Mark Mendez \"Reza\"  MRN: [id number]  Today's Date: 8/5/2025     Current Problem:  1. Right shoulder pain, unspecified chronicity            Visit 1    Subjective:  Change in pain/ pain level: 2/10    Objective: ASHLEY IR decrease better  RIR: NE/NE  Fun IR/ER equal  ER 4/5, 5/5     Treatment:    Therapeutic exercise (49907):  MDT review education HEP       Assessment:   Patient notes improved pain levels resultant from activities in therapy session. Improved tissue quality noted as well as body mechanics demonstrated after cueing and corrections. Patient continues to require active therapy to progress functional capabilities with decreasing pain levels and improving mechanics with functional activities including progression of Home exercise program. Tolerance to today's treatment was good.   Patient appears motivated and compliant this date, demonstrating a working understanding of principals instructed and home program.    Plan:  Progress with functional strength as tolerated with respect to tissue healing. Manual therapy PRN to improve/maintain ROM, prevent adhesion, reduce pain.  "

## 2025-08-12 ENCOUNTER — TREATMENT (OUTPATIENT)
Dept: PHYSICAL THERAPY | Facility: CLINIC | Age: 65
End: 2025-08-12
Payer: COMMERCIAL

## 2025-08-12 DIAGNOSIS — M17.11 PRIMARY OSTEOARTHRITIS OF RIGHT KNEE: ICD-10-CM

## 2025-08-12 DIAGNOSIS — Z96.651 STATUS POST TOTAL RIGHT KNEE REPLACEMENT: ICD-10-CM

## 2025-08-12 DIAGNOSIS — M25.511 RIGHT SHOULDER PAIN, UNSPECIFIED CHRONICITY: Primary | ICD-10-CM

## 2025-08-12 PROCEDURE — 97110 THERAPEUTIC EXERCISES: CPT | Mod: GP

## 2025-08-12 PROCEDURE — 97140 MANUAL THERAPY 1/> REGIONS: CPT | Mod: GP

## 2025-08-13 ENCOUNTER — APPOINTMENT (OUTPATIENT)
Dept: RADIOLOGY | Facility: CLINIC | Age: 65
End: 2025-08-13
Payer: COMMERCIAL

## 2025-08-13 DIAGNOSIS — M54.12 CERVICAL RADICULOPATHY: ICD-10-CM

## 2025-08-13 DIAGNOSIS — M75.101 TEAR OF RIGHT ROTATOR CUFF, UNSPECIFIED TEAR EXTENT, UNSPECIFIED WHETHER TRAUMATIC: ICD-10-CM

## 2025-08-13 DIAGNOSIS — M75.81 TENDINITIS OF RIGHT ROTATOR CUFF: ICD-10-CM

## 2025-08-13 DIAGNOSIS — M25.511 RIGHT SHOULDER PAIN, UNSPECIFIED CHRONICITY: ICD-10-CM

## 2025-08-13 PROCEDURE — 73221 MRI JOINT UPR EXTREM W/O DYE: CPT | Mod: RT

## 2025-08-13 PROCEDURE — 73221 MRI JOINT UPR EXTREM W/O DYE: CPT | Mod: RIGHT SIDE | Performed by: STUDENT IN AN ORGANIZED HEALTH CARE EDUCATION/TRAINING PROGRAM

## 2025-08-19 ENCOUNTER — HOSPITAL ENCOUNTER (OUTPATIENT)
Dept: NEUROLOGY | Facility: HOSPITAL | Age: 65
Discharge: HOME | End: 2025-08-19
Payer: COMMERCIAL

## 2025-08-19 DIAGNOSIS — M54.12 CERVICAL RADICULOPATHY: ICD-10-CM

## 2025-08-19 PROCEDURE — 95910 NRV CNDJ TEST 7-8 STUDIES: CPT

## 2025-08-20 ENCOUNTER — TREATMENT (OUTPATIENT)
Dept: PHYSICAL THERAPY | Facility: CLINIC | Age: 65
End: 2025-08-20
Payer: COMMERCIAL

## 2025-08-20 DIAGNOSIS — M25.511 RIGHT SHOULDER PAIN, UNSPECIFIED CHRONICITY: Primary | ICD-10-CM

## 2025-08-20 DIAGNOSIS — Z96.651 STATUS POST TOTAL RIGHT KNEE REPLACEMENT: ICD-10-CM

## 2025-08-20 DIAGNOSIS — M17.11 PRIMARY OSTEOARTHRITIS OF RIGHT KNEE: ICD-10-CM

## 2025-08-20 PROCEDURE — 97110 THERAPEUTIC EXERCISES: CPT | Mod: GP

## 2025-08-27 ENCOUNTER — TREATMENT (OUTPATIENT)
Dept: PHYSICAL THERAPY | Facility: CLINIC | Age: 65
End: 2025-08-27
Payer: COMMERCIAL

## 2025-08-27 ENCOUNTER — APPOINTMENT (OUTPATIENT)
Dept: ORTHOPEDIC SURGERY | Facility: CLINIC | Age: 65
End: 2025-08-27
Payer: COMMERCIAL

## 2025-08-27 DIAGNOSIS — M25.511 RIGHT SHOULDER PAIN, UNSPECIFIED CHRONICITY: Primary | ICD-10-CM

## 2025-08-27 DIAGNOSIS — G56.03 BILATERAL CARPAL TUNNEL SYNDROME: Primary | ICD-10-CM

## 2025-08-27 PROCEDURE — 97110 THERAPEUTIC EXERCISES: CPT | Mod: GP

## 2025-08-27 PROCEDURE — 1036F TOBACCO NON-USER: CPT | Performed by: ORTHOPAEDIC SURGERY

## 2025-08-27 PROCEDURE — 99213 OFFICE O/P EST LOW 20 MIN: CPT | Performed by: ORTHOPAEDIC SURGERY

## 2025-08-28 ENCOUNTER — DOCUMENTATION (OUTPATIENT)
Dept: ORTHOPEDIC SURGERY | Facility: CLINIC | Age: 65
End: 2025-08-28
Payer: COMMERCIAL

## 2025-08-28 ENCOUNTER — APPOINTMENT (OUTPATIENT)
Dept: ORTHOPEDIC SURGERY | Facility: CLINIC | Age: 65
End: 2025-08-28
Payer: COMMERCIAL

## 2025-08-28 PROCEDURE — L3908 WHO COCK-UP NONMOLDE PRE OTS: HCPCS | Performed by: ORTHOPAEDIC SURGERY

## 2025-09-02 ENCOUNTER — OFFICE VISIT (OUTPATIENT)
Dept: ORTHOPEDIC SURGERY | Facility: CLINIC | Age: 65
End: 2025-09-02
Payer: COMMERCIAL

## 2025-09-02 DIAGNOSIS — G56.03 BILATERAL CARPAL TUNNEL SYNDROME: Primary | ICD-10-CM

## 2025-09-02 PROCEDURE — 99212 OFFICE O/P EST SF 10 MIN: CPT | Mod: 25 | Performed by: ORTHOPAEDIC SURGERY

## 2025-09-02 PROCEDURE — 20526 THER INJECTION CARP TUNNEL: CPT | Mod: LT | Performed by: ORTHOPAEDIC SURGERY

## 2025-09-02 PROCEDURE — 1036F TOBACCO NON-USER: CPT | Performed by: ORTHOPAEDIC SURGERY

## 2025-09-02 PROCEDURE — 2500000004 HC RX 250 GENERAL PHARMACY W/ HCPCS (ALT 636 FOR OP/ED): Performed by: ORTHOPAEDIC SURGERY

## 2025-09-02 PROCEDURE — 20526 THER INJECTION CARP TUNNEL: CPT | Mod: RT | Performed by: ORTHOPAEDIC SURGERY

## 2025-09-02 PROCEDURE — 99214 OFFICE O/P EST MOD 30 MIN: CPT | Performed by: ORTHOPAEDIC SURGERY

## 2025-09-02 PROCEDURE — 20526 THER INJECTION CARP TUNNEL: CPT | Performed by: ORTHOPAEDIC SURGERY

## 2025-09-02 RX ORDER — LIDOCAINE HYDROCHLORIDE 10 MG/ML
1 INJECTION, SOLUTION INFILTRATION; PERINEURAL
Status: COMPLETED | OUTPATIENT
Start: 2025-09-02 | End: 2025-09-02

## 2025-09-02 RX ORDER — BETAMETHASONE SODIUM PHOSPHATE AND BETAMETHASONE ACETATE 3; 3 MG/ML; MG/ML
6 INJECTION, SUSPENSION INTRA-ARTICULAR; INTRALESIONAL; INTRAMUSCULAR; SOFT TISSUE
Status: COMPLETED | OUTPATIENT
Start: 2025-09-02 | End: 2025-09-02

## 2025-09-02 RX ADMIN — BETAMETHASONE SODIUM PHOSPHATE AND BETAMETHASONE ACETATE 6 MG: 3; 3 INJECTION, SUSPENSION INTRA-ARTICULAR; INTRALESIONAL; INTRAMUSCULAR at 15:21

## 2025-09-02 RX ADMIN — LIDOCAINE HYDROCHLORIDE 1 ML: 10 INJECTION, SOLUTION INFILTRATION; PERINEURAL at 15:21

## 2026-03-11 ENCOUNTER — APPOINTMENT (OUTPATIENT)
Dept: CARDIOLOGY | Facility: CLINIC | Age: 66
End: 2026-03-11
Payer: COMMERCIAL